# Patient Record
Sex: MALE | Race: WHITE | Employment: OTHER | URBAN - METROPOLITAN AREA
[De-identification: names, ages, dates, MRNs, and addresses within clinical notes are randomized per-mention and may not be internally consistent; named-entity substitution may affect disease eponyms.]

---

## 2023-03-11 ENCOUNTER — APPOINTMENT (OUTPATIENT)
Dept: CT IMAGING | Age: 78
End: 2023-03-11
Payer: MEDICARE

## 2023-03-11 ENCOUNTER — APPOINTMENT (OUTPATIENT)
Dept: MRI IMAGING | Age: 78
DRG: 062 | End: 2023-03-11
Attending: INTERNAL MEDICINE
Payer: MEDICARE

## 2023-03-11 ENCOUNTER — APPOINTMENT (OUTPATIENT)
Dept: CT IMAGING | Age: 78
DRG: 062 | End: 2023-03-11
Attending: INTERNAL MEDICINE
Payer: MEDICARE

## 2023-03-11 ENCOUNTER — HOSPITAL ENCOUNTER (INPATIENT)
Age: 78
LOS: 2 days | Discharge: HOME | DRG: 062 | End: 2023-03-13
Attending: INTERNAL MEDICINE | Admitting: INTERNAL MEDICINE
Payer: MEDICARE

## 2023-03-11 ENCOUNTER — HOSPITAL ENCOUNTER (EMERGENCY)
Age: 78
Discharge: ANOTHER HOSPITAL | End: 2023-03-11
Attending: EMERGENCY MEDICINE
Payer: MEDICARE

## 2023-03-11 VITALS
SYSTOLIC BLOOD PRESSURE: 172 MMHG | TEMPERATURE: 97.4 F | HEIGHT: 67 IN | BODY MASS INDEX: 24.64 KG/M2 | DIASTOLIC BLOOD PRESSURE: 83 MMHG | HEART RATE: 59 BPM | RESPIRATION RATE: 22 BRPM | WEIGHT: 157 LBS | OXYGEN SATURATION: 96 %

## 2023-03-11 DIAGNOSIS — I63.9 CEREBROVASCULAR ACCIDENT (CVA), UNSPECIFIED MECHANISM (HCC): Primary | ICD-10-CM

## 2023-03-11 LAB
ALBUMIN SERPL-MCNC: 4.3 G/DL (ref 3.5–4.6)
ALP BLD-CCNC: 104 U/L (ref 35–104)
ALT SERPL-CCNC: 18 U/L (ref 0–41)
ANION GAP SERPL CALCULATED.3IONS-SCNC: 9 MEQ/L (ref 9–15)
APTT: 41.7 SEC (ref 24.4–36.8)
AST SERPL-CCNC: 27 U/L (ref 0–40)
BACTERIA: NEGATIVE /HPF
BASOPHILS ABSOLUTE: 0.1 K/UL (ref 0–0.1)
BASOPHILS RELATIVE PERCENT: 1 % (ref 0.2–1.2)
BILIRUB SERPL-MCNC: 0.7 MG/DL (ref 0.2–0.7)
BILIRUBIN URINE: NEGATIVE
BLOOD, URINE: NORMAL
BUN BLDV-MCNC: 15 MG/DL (ref 8–23)
CALCIUM SERPL-MCNC: 9.9 MG/DL (ref 8.5–9.9)
CHLORIDE BLD-SCNC: 103 MEQ/L (ref 95–107)
CHP ED QC CHECK: YES
CLARITY: CLEAR
CO2: 29 MEQ/L (ref 20–31)
COLOR: YELLOW
CREAT SERPL-MCNC: 0.89 MG/DL (ref 0.7–1.2)
EOSINOPHILS ABSOLUTE: 0.1 K/UL (ref 0–0.5)
EOSINOPHILS RELATIVE PERCENT: 1.7 % (ref 0.8–7)
EPITHELIAL CELLS, UA: ABNORMAL /HPF
GFR SERPL CREATININE-BSD FRML MDRD: >60 ML/MIN/{1.73_M2}
GLOBULIN: 2.4 G/DL (ref 2.3–3.5)
GLUCOSE BLD-MCNC: 104 MG/DL (ref 70–99)
GLUCOSE BLD-MCNC: 112 MG/DL
GLUCOSE BLD-MCNC: 112 MG/DL
GLUCOSE BLD-MCNC: 112 MG/DL (ref 70–99)
GLUCOSE URINE: NEGATIVE MG/DL
HCT VFR BLD CALC: 42.6 % (ref 42–52)
HCT VFR BLD CALC: 43.5 % (ref 42–52)
HEMOGLOBIN: 14.5 G/DL (ref 13.7–17.5)
HEMOGLOBIN: 14.6 G/DL (ref 14–18)
IMMATURE GRANULOCYTES #: 0 K/UL
IMMATURE GRANULOCYTES %: 0.1 %
INR BLD: 1
KETONES, URINE: NEGATIVE MG/DL
LEUKOCYTE ESTERASE, URINE: NEGATIVE
LYMPHOCYTES ABSOLUTE: 1.4 K/UL (ref 1.3–3.6)
LYMPHOCYTES RELATIVE PERCENT: 20.8 %
MAGNESIUM: 2.2 MG/DL (ref 1.7–2.4)
MCH RBC QN AUTO: 31.3 PG (ref 27–31.3)
MCH RBC QN AUTO: 31.7 PG (ref 25.7–32.2)
MCHC RBC AUTO-ENTMCNC: 33.5 % (ref 33–37)
MCHC RBC AUTO-ENTMCNC: 34 % (ref 32.3–36.5)
MCV RBC AUTO: 93 FL (ref 79–92.2)
MCV RBC AUTO: 93.2 FL (ref 79–92.2)
MONOCYTES ABSOLUTE: 0.6 K/UL (ref 0.3–0.8)
MONOCYTES RELATIVE PERCENT: 9.3 % (ref 5.3–12.2)
NEUTROPHILS ABSOLUTE: 4.6 K/UL (ref 1.8–5.4)
NEUTROPHILS RELATIVE PERCENT: 67.1 % (ref 34–67.9)
NITRITE, URINE: NEGATIVE
PDW BLD-RTO: 12.3 % (ref 11.6–14.4)
PDW BLD-RTO: 13.5 % (ref 11.5–14.5)
PERFORMED ON: ABNORMAL
PH UA: 7 (ref 5–9)
PLATELET # BLD: 235 K/UL (ref 163–337)
PLATELET # BLD: 242 K/UL (ref 130–400)
POTASSIUM SERPL-SCNC: 4.4 MEQ/L (ref 3.4–4.9)
PROTEIN UA: NEGATIVE MG/DL
PROTHROMBIN TIME: 13.4 SEC (ref 12.3–14.9)
RBC # BLD: 4.58 M/UL (ref 4.63–6.08)
RBC # BLD: 4.66 M/UL (ref 4.7–6.1)
RBC UA: ABNORMAL /HPF (ref 0–2)
SODIUM BLD-SCNC: 141 MEQ/L (ref 135–144)
SPECIFIC GRAVITY UA: 1.01 (ref 1–1.03)
TOTAL PROTEIN: 6.7 G/DL (ref 6.3–8)
TROPONIN: <0.01 NG/ML (ref 0–0.01)
UROBILINOGEN, URINE: 0.2 E.U./DL
WBC # BLD: 15.8 K/UL (ref 4.8–10.8)
WBC # BLD: 6.9 K/UL (ref 4.2–9)
WBC UA: ABNORMAL /HPF (ref 0–5)

## 2023-03-11 PROCEDURE — 2580000003 HC RX 258: Performed by: INTERNAL MEDICINE

## 2023-03-11 PROCEDURE — 36415 COLL VENOUS BLD VENIPUNCTURE: CPT

## 2023-03-11 PROCEDURE — 81001 URINALYSIS AUTO W/SCOPE: CPT

## 2023-03-11 PROCEDURE — 94150 VITAL CAPACITY TEST: CPT

## 2023-03-11 PROCEDURE — 80053 COMPREHEN METABOLIC PANEL: CPT

## 2023-03-11 PROCEDURE — 83735 ASSAY OF MAGNESIUM: CPT

## 2023-03-11 PROCEDURE — 85027 COMPLETE CBC AUTOMATED: CPT

## 2023-03-11 PROCEDURE — 6360000002 HC RX W HCPCS: Performed by: INTERNAL MEDICINE

## 2023-03-11 PROCEDURE — 93005 ELECTROCARDIOGRAM TRACING: CPT

## 2023-03-11 PROCEDURE — 74176 CT ABD & PELVIS W/O CONTRAST: CPT

## 2023-03-11 PROCEDURE — 6370000000 HC RX 637 (ALT 250 FOR IP): Performed by: INTERNAL MEDICINE

## 2023-03-11 PROCEDURE — 70551 MRI BRAIN STEM W/O DYE: CPT

## 2023-03-11 PROCEDURE — 2000000000 HC ICU R&B

## 2023-03-11 PROCEDURE — 85610 PROTHROMBIN TIME: CPT

## 2023-03-11 PROCEDURE — 6360000002 HC RX W HCPCS

## 2023-03-11 PROCEDURE — 85730 THROMBOPLASTIN TIME PARTIAL: CPT

## 2023-03-11 PROCEDURE — 70450 CT HEAD/BRAIN W/O DYE: CPT

## 2023-03-11 PROCEDURE — 84484 ASSAY OF TROPONIN QUANT: CPT

## 2023-03-11 PROCEDURE — 6360000002 HC RX W HCPCS: Performed by: EMERGENCY MEDICINE

## 2023-03-11 PROCEDURE — 96374 THER/PROPH/DIAG INJ IV PUSH: CPT

## 2023-03-11 PROCEDURE — 96361 HYDRATE IV INFUSION ADD-ON: CPT

## 2023-03-11 PROCEDURE — 99285 EMERGENCY DEPT VISIT HI MDM: CPT

## 2023-03-11 PROCEDURE — 85025 COMPLETE CBC W/AUTO DIFF WBC: CPT

## 2023-03-11 PROCEDURE — 2580000003 HC RX 258: Performed by: EMERGENCY MEDICINE

## 2023-03-11 RX ORDER — SODIUM CHLORIDE, SODIUM LACTATE, POTASSIUM CHLORIDE, CALCIUM CHLORIDE 600; 310; 30; 20 MG/100ML; MG/100ML; MG/100ML; MG/100ML
INJECTION, SOLUTION INTRAVENOUS CONTINUOUS
Status: DISCONTINUED | OUTPATIENT
Start: 2023-03-11 | End: 2023-03-13 | Stop reason: HOSPADM

## 2023-03-11 RX ORDER — ENOXAPARIN SODIUM 100 MG/ML
40 INJECTION SUBCUTANEOUS DAILY
Status: DISCONTINUED | OUTPATIENT
Start: 2023-03-12 | End: 2023-03-12

## 2023-03-11 RX ORDER — ONDANSETRON 4 MG/1
4 TABLET, ORALLY DISINTEGRATING ORAL EVERY 8 HOURS PRN
Status: DISCONTINUED | OUTPATIENT
Start: 2023-03-11 | End: 2023-03-13 | Stop reason: HOSPADM

## 2023-03-11 RX ORDER — ASPIRIN 81 MG/1
81 TABLET ORAL DAILY
Status: DISCONTINUED | OUTPATIENT
Start: 2023-03-12 | End: 2023-03-13 | Stop reason: HOSPADM

## 2023-03-11 RX ORDER — ONDANSETRON 2 MG/ML
4 INJECTION INTRAMUSCULAR; INTRAVENOUS EVERY 6 HOURS PRN
Status: DISCONTINUED | OUTPATIENT
Start: 2023-03-11 | End: 2023-03-13 | Stop reason: HOSPADM

## 2023-03-11 RX ORDER — HYDRALAZINE HYDROCHLORIDE 20 MG/ML
10 INJECTION INTRAMUSCULAR; INTRAVENOUS EVERY 30 MIN PRN
Status: DISCONTINUED | OUTPATIENT
Start: 2023-03-11 | End: 2023-03-13 | Stop reason: HOSPADM

## 2023-03-11 RX ORDER — POLYETHYLENE GLYCOL 3350 17 G/17G
17 POWDER, FOR SOLUTION ORAL DAILY PRN
Status: DISCONTINUED | OUTPATIENT
Start: 2023-03-11 | End: 2023-03-13 | Stop reason: HOSPADM

## 2023-03-11 RX ORDER — LABETALOL HYDROCHLORIDE 5 MG/ML
10 INJECTION, SOLUTION INTRAVENOUS EVERY 10 MIN PRN
Status: DISCONTINUED | OUTPATIENT
Start: 2023-03-11 | End: 2023-03-13 | Stop reason: HOSPADM

## 2023-03-11 RX ORDER — ATORVASTATIN CALCIUM 80 MG/1
80 TABLET, FILM COATED ORAL NIGHTLY
Status: DISCONTINUED | OUTPATIENT
Start: 2023-03-11 | End: 2023-03-11

## 2023-03-11 RX ORDER — ACETAMINOPHEN 325 MG/1
650 TABLET ORAL EVERY 4 HOURS PRN
Status: DISCONTINUED | OUTPATIENT
Start: 2023-03-11 | End: 2023-03-13 | Stop reason: HOSPADM

## 2023-03-11 RX ORDER — ASPIRIN 300 MG/1
300 SUPPOSITORY RECTAL DAILY
Status: DISCONTINUED | OUTPATIENT
Start: 2023-03-12 | End: 2023-03-13 | Stop reason: HOSPADM

## 2023-03-11 RX ORDER — ACETAMINOPHEN 650 MG/1
650 SUPPOSITORY RECTAL EVERY 4 HOURS PRN
Status: DISCONTINUED | OUTPATIENT
Start: 2023-03-11 | End: 2023-03-13 | Stop reason: HOSPADM

## 2023-03-11 RX ORDER — ASPIRIN 81 MG/1
81 TABLET ORAL DAILY
COMMUNITY

## 2023-03-11 RX ORDER — HYDRALAZINE HYDROCHLORIDE 20 MG/ML
10 INJECTION INTRAMUSCULAR; INTRAVENOUS ONCE
Status: DISCONTINUED | OUTPATIENT
Start: 2023-03-11 | End: 2023-03-11

## 2023-03-11 RX ORDER — HYDRALAZINE HYDROCHLORIDE 20 MG/ML
10 INJECTION INTRAMUSCULAR; INTRAVENOUS ONCE
Status: COMPLETED | OUTPATIENT
Start: 2023-03-11 | End: 2023-03-11

## 2023-03-11 RX ORDER — ASPIRIN 81 MG/1
81 TABLET ORAL DAILY
Status: ON HOLD | COMMUNITY
End: 2023-03-11

## 2023-03-11 RX ORDER — TAMSULOSIN HYDROCHLORIDE 0.4 MG/1
0.4 CAPSULE ORAL DAILY
Status: DISCONTINUED | OUTPATIENT
Start: 2023-03-12 | End: 2023-03-12

## 2023-03-11 RX ORDER — HYDRALAZINE HYDROCHLORIDE 20 MG/ML
INJECTION INTRAMUSCULAR; INTRAVENOUS
Status: COMPLETED
Start: 2023-03-11 | End: 2023-03-11

## 2023-03-11 RX ORDER — ROSUVASTATIN CALCIUM 40 MG/1
40 TABLET, COATED ORAL NIGHTLY
Status: DISCONTINUED | OUTPATIENT
Start: 2023-03-11 | End: 2023-03-13 | Stop reason: HOSPADM

## 2023-03-11 RX ORDER — LIDOCAINE 4 G/G
1 PATCH TOPICAL DAILY
Status: DISCONTINUED | OUTPATIENT
Start: 2023-03-11 | End: 2023-03-13 | Stop reason: HOSPADM

## 2023-03-11 RX ORDER — SODIUM CHLORIDE 0.9 % (FLUSH) 0.9 %
10 SYRINGE (ML) INJECTION ONCE
Status: COMPLETED | OUTPATIENT
Start: 2023-03-11 | End: 2023-03-11

## 2023-03-11 RX ORDER — SIMVASTATIN 40 MG
40 TABLET ORAL NIGHTLY
COMMUNITY

## 2023-03-11 RX ORDER — MORPHINE SULFATE 2 MG/ML
1 INJECTION, SOLUTION INTRAMUSCULAR; INTRAVENOUS EVERY 4 HOURS PRN
Status: DISCONTINUED | OUTPATIENT
Start: 2023-03-11 | End: 2023-03-13 | Stop reason: HOSPADM

## 2023-03-11 RX ORDER — 0.9 % SODIUM CHLORIDE 0.9 %
1000 INTRAVENOUS SOLUTION INTRAVENOUS ONCE
Status: COMPLETED | OUTPATIENT
Start: 2023-03-11 | End: 2023-03-11

## 2023-03-11 RX ADMIN — HYDRALAZINE HYDROCHLORIDE 10 MG: 20 INJECTION INTRAMUSCULAR; INTRAVENOUS at 15:31

## 2023-03-11 RX ADMIN — Medication 10 ML: at 13:20

## 2023-03-11 RX ADMIN — SODIUM CHLORIDE, POTASSIUM CHLORIDE, SODIUM LACTATE AND CALCIUM CHLORIDE: 600; 310; 30; 20 INJECTION, SOLUTION INTRAVENOUS at 21:39

## 2023-03-11 RX ADMIN — SODIUM CHLORIDE 1000 ML: 9 INJECTION, SOLUTION INTRAVENOUS at 12:49

## 2023-03-11 RX ADMIN — ONDANSETRON 4 MG: 2 INJECTION INTRAMUSCULAR; INTRAVENOUS at 16:17

## 2023-03-11 RX ADMIN — ACETAMINOPHEN 650 MG: 325 TABLET ORAL at 21:25

## 2023-03-11 RX ADMIN — Medication 18 MG: at 13:18

## 2023-03-11 RX ADMIN — ROSUVASTATIN CALCIUM 40 MG: 40 TABLET, FILM COATED ORAL at 23:11

## 2023-03-11 ASSESSMENT — ENCOUNTER SYMPTOMS
ABDOMINAL PAIN: 0
ABDOMINAL DISTENTION: 0
APNEA: 0
RHINORRHEA: 0
DIARRHEA: 0
SINUS PRESSURE: 0
CONSTIPATION: 0
VOMITING: 0
COUGH: 0
SORE THROAT: 0
BACK PAIN: 0
WHEEZING: 0
NAUSEA: 0
EYE PAIN: 0
PHOTOPHOBIA: 0
SHORTNESS OF BREATH: 0
COLOR CHANGE: 0

## 2023-03-11 ASSESSMENT — PAIN - FUNCTIONAL ASSESSMENT
PAIN_FUNCTIONAL_ASSESSMENT: NONE - DENIES PAIN
PAIN_FUNCTIONAL_ASSESSMENT: NONE - DENIES PAIN

## 2023-03-11 ASSESSMENT — PAIN DESCRIPTION - LOCATION
LOCATION: ABDOMEN
LOCATION: ABDOMEN

## 2023-03-11 ASSESSMENT — PAIN SCALES - GENERAL
PAINLEVEL_OUTOF10: 7
PAINLEVEL_OUTOF10: 5
PAINLEVEL_OUTOF10: 0
PAINLEVEL_OUTOF10: 7
PAINLEVEL_OUTOF10: 7

## 2023-03-11 ASSESSMENT — PAIN DESCRIPTION - ORIENTATION
ORIENTATION: LEFT
ORIENTATION: LEFT

## 2023-03-11 ASSESSMENT — PAIN DESCRIPTION - RADICULAR PAIN: RADICULAR_PAIN: ABSENT

## 2023-03-11 ASSESSMENT — PAIN DESCRIPTION - DESCRIPTORS
DESCRIPTORS: SHARP
DESCRIPTORS: SHARP

## 2023-03-11 NOTE — PROGRESS NOTES
Spiritual Care Services     Summary of Visit:  Pt in bed, reports no residual effects, only complaint is back pain. Nurse and physician both present. Pt's wife Prosper Paniagua also present. Pt spiritually comfortable. No needs expressed at this time. Encounter Summary  Encounter Overview/Reason : Initial Encounter  Service Provided For[de-identified] Patient and family together  Referral/Consult From[de-identified] Rounding  Support System: Spouse  Complexity of Encounter: Low  Begin Time: 1600  End Time : 1384  Total Time Calculated: 15 min  Encounter   Type: Initial Screen/Assessment                            Spiritual Assessment/Intervention/Outcomes:    Assessment: Concerns with suffering    Intervention: Active listening, Discussed illness injury and its impact    Outcome: Coping      Care Plan:    Plan and Referrals  Plan/Referrals: Continue Support (comment)    Spiritual Care Services   Electronically signed by Brett Andujar [facility] on 3/11/2023 at 4:07 PM.    To reach a  for emotional and spiritual support, place an [facility] consult request.   If a  is needed immediately, dial 0 and ask to page the on-call .

## 2023-03-11 NOTE — ED NOTES
[facility] called and provided room assignment to ED [facility] - ICU 5     Isaac Alegria RN  03/11/23 6085

## 2023-03-11 NOTE — ED NOTES
Called Dr. Swan Player answering service to page for Dr santi matthew.       Giselle May  03/11/23 4432

## 2023-03-11 NOTE — PROGRESS NOTES
Pharmacy Consult    Reed Johnson is a 78 y.o. male for whom pharmacy [facility]  [facility] [employer] to change IV base solutions to 0.9% sodium chloride. Patient Active Problem List   Diagnosis    S/P CABG (coronary artery bypass graft)    Hypoxemia    CAD (coronary artery disease)    Hypertension    Acute CVA (cerebrovascular accident) ([facility] [address]       Allergies:  Patient has no known allergies. Recent Labs     03/11/23  1252   CREATININE 0.89   Estimated Creatinine Clearance: 67 mL/min (based on SCr of 0.89 mg/dL). .     ,  , There is no height or weight on file to calculate BMI. Assessment/Plan:    No orders requiring change at this time. Thank you for the consult. Will continue to follow.      Lois Funez [facility]  03/11/23  3:52 PM

## 2023-03-11 NOTE — ED NOTES
Called [phone] [facility] supervisor to inform we are sending code BAT.       Maye Left  03/11/23 6209

## 2023-03-11 NOTE — ED PROVIDER NOTES
[facility] ED  eMERGENCY dEPARTMENT eNCOUnter      Pt Name: Kam Vance  MRN: [id number]  [place] 1945  Date of evaluation: 3/11/2023  Provider: Graciela Le MD    CHIEF COMPLAINT       Chief Complaint   Patient presents with    Numbness     Right side head to toe         HISTORY OF PRESENT ILLNESS   (Location/Symptom, Timing/Onset,Context/Setting, Quality, Duration, Modifying Factors, Severity)  Note limiting factors. Kam Vance is a 78 y.o. male who presents to the emergency department with complaint of right side of face and lips numbness, right tongue and right lower extremity numbness which began while he was eating lunch. He also noted some slurred speech. Started about 1 and 1/2 hours ago. Mostly resolved but still residual.  Denies prior history of CVA or TIA. Not on blood thinners. History of coronary artery disease status post CABG. Comorbid conditions includes hyperlipidemia and hypertension. HPI    Nursing Notes were reviewed. REVIEW OF SYSTEMS    (2-9 systems for level 4, 10 or more for level 5)     Review of Systems   Constitutional: Negative. Negative for activity change, appetite change, chills, fatigue and fever. HENT:  Negative for congestion, ear discharge, ear pain, hearing loss, rhinorrhea, sinus pressure and sore throat. Eyes:  Negative for photophobia, pain and visual disturbance. Respiratory:  Negative for apnea, cough, shortness of breath and wheezing. Cardiovascular:  Negative for chest pain, palpitations and leg swelling. Gastrointestinal:  Negative for abdominal distention, abdominal pain, constipation, diarrhea, nausea and vomiting. Endocrine: Negative for cold intolerance, heat intolerance and polyuria. Genitourinary:  Negative for dysuria, flank pain, frequency and urgency. Musculoskeletal:  Negative for arthralgias, back pain, gait problem, myalgias and neck stiffness. Skin:  Negative for color change, pallor and rash. Allergic/Immunologic: Negative for food allergies and immunocompromised state. Neurological:  Positive for speech difficulty and numbness. Negative for dizziness, tremors, syncope, weakness, light-headedness and headaches. Hematological:  Negative for adenopathy. Psychiatric/Behavioral:  Negative for agitation, confusion and hallucinations. All other systems reviewed and are negative. Except as noted above the remainder of the review of systems was reviewed and negative. PAST MEDICAL HISTORY     Past Medical History:   Diagnosis Date    Hyperlipidemia          SURGICAL HISTORY       Past Surgical History:   Procedure Laterality Date    CARDIAC SURGERY           CURRENT MEDICATIONS       Previous Medications    ASPIRIN 81 MG EC TABLET    Take 81 mg by mouth daily    SIMVASTATIN (ZOCOR) 40 MG TABLET    Take 40 mg by mouth nightly       ALLERGIES     Patient has no known allergies. FAMILY HISTORY     History reviewed. No pertinent family history. SOCIAL HISTORY       Social History     Socioeconomic History    Marital status:      Spouse name: None    Number of children: None    Years of education: None    Highest education level: None   Tobacco Use    Smoking status: Never     Passive exposure: Never    Smokeless tobacco: Never   Vaping Use    Vaping Use: Never used   Substance and Sexual Activity    Alcohol use: Never    Drug use: Never    Sexual activity: Yes     Partners: Female       SCREENINGS   NIH Stroke Scale  Interval: Baseline  Level of Consciousness (1a): Not alert, requires repeated stimulation to attend  LOC Questions (1b): Answers both correctly  LOC Commands (1c): Performs both tasks correctly  Best Gaze (2): Normal  Visual (3): No visual loss  Facial Palsy (4): Normal symmetrical movement  Motor Arm, Left (5a): No drift  Motor Arm, Right (5b): No drift  Motor Leg, Left (6a): No drift  Motor Leg, Right (6b):  No drift  Limb Ataxia (7): Absent  Sensory (8): (!) Mild to Moderate (Reports right side numbness)  Best Language (9): Mild to moderate aphasia (Reports he feels like speech is slurred )  Dysarthria (10): Normal  Extinction and Inattention (11): No abnormality  Total: 4Glasgow Coma Scale  Eye Opening: Spontaneous  Best Verbal Response: Oriented  Best Motor Response: Obeys commands  Dariana Coma Scale Score: 15        PHYSICAL EXAM    (up to 7 for level 4, 8 or more for level 5)     ED Triage Vitals [03/11/23 1235]   BP Temp Temp Source Heart Rate Resp SpO2 Height Weight   (!) 172/77 97.6 °F (36.4 °C) Oral 63 28 96 % 5' 7\" (1.702 m) 157 lb (71.2 kg)       Physical Exam  Vitals and nursing note reviewed.   Constitutional:       General: He is not in acute distress.     Appearance: Normal appearance. He is well-developed and normal weight. He is not ill-appearing, toxic-appearing or diaphoretic.   HENT:      Head: Normocephalic and atraumatic.      Nose: Nose normal. No congestion or rhinorrhea.      Mouth/Throat:      Mouth: Mucous membranes are moist.      Pharynx: Oropharynx is clear. No oropharyngeal exudate or posterior oropharyngeal erythema.   Eyes:      General: No scleral icterus.        Right eye: No discharge.         Left eye: No discharge.      Extraocular Movements: Extraocular movements intact.      Conjunctiva/sclera: Conjunctivae normal.      Pupils: Pupils are equal, round, and reactive to light.   Neck:      Thyroid: No thyromegaly.      Vascular: No carotid bruit or JVD.      Trachea: No tracheal deviation.   Cardiovascular:      Rate and Rhythm: Normal rate and regular rhythm.      Pulses: Normal pulses.      Heart sounds: Normal heart sounds. No murmur heard.    No friction rub. No gallop.   Pulmonary:      Effort: Pulmonary effort is normal. No respiratory distress.      Breath sounds: Normal breath sounds. No stridor. No wheezing, rhonchi or rales.   Chest:      Chest wall: No tenderness.   Abdominal:      General: Abdomen is flat. Bowel  sounds are normal. There is no distension. Palpations: Abdomen is soft. There is no mass. Tenderness: There is no abdominal tenderness. There is no right CVA tenderness, left CVA tenderness, guarding or rebound. Hernia: No hernia is present. Musculoskeletal:         General: No swelling, tenderness, deformity or signs of injury. Normal range of motion. Cervical back: Normal range of motion and neck supple. No rigidity or tenderness. Right lower leg: No edema. Left lower leg: No edema. Lymphadenopathy:      Cervical: No cervical adenopathy. Skin:     General: Skin is warm and dry. Capillary Refill: Capillary refill takes less than 2 seconds. Coloration: Skin is not jaundiced or pale. Findings: No bruising, erythema, lesion or rash. Neurological:      General: No focal deficit present. Mental Status: He is alert and oriented to person, place, and time. Mental status is at baseline. Cranial Nerves: No cranial nerve deficit. Sensory: No sensory deficit. Motor: No weakness or abnormal muscle tone. Coordination: Coordination normal.      Gait: Gait normal.      Deep Tendon Reflexes: Reflexes are normal and symmetric. Reflexes normal.   Psychiatric:         Mood and Affect: Mood normal.         Behavior: Behavior normal.         Thought Content: Thought content normal.         Judgment: Judgment normal.       DIAGNOSTIC RESULTS     EKG: All EKG's are interpreted by the Emergency Department Physician who either signs or Co-signs this chart in the absence of a cardiologist.    Twelve-lead EKG shows sinus bradycardia with sinus arrhythmia, rate 58 bpm, normal intervals, normal electrical axis, no acute ST-T wave changes. My interpretation.     RADIOLOGY:   Non-plain film images such as CT, Ultrasound and MRI are read by the radiologist. Solange Cohn radiographicimages are visualized and preliminarily interpreted by the emergency physician with the below findings:    Chest x-ray showed no acute cardiopulmonary process. My interpretation. Interpretation per the Radiologist below, if available at the time of this note:    CT Head W/O Contrast   Final Result   1. No acute intracranial hemorrhage. 2. Focal area of decreased attenuation associated with left frontal white   matter may represent a area of chronic microvascular ischemia versus stroke   of uncertain chronicity. MRI could be helpful for further evaluation   3. Additional areas of decreased attenuation in periventricular white matter   of both hemispheres suggesting chronic microvascular ischemia. ED BEDSIDE ULTRASOUND:   Performed by ED Physician - none    LABS:  Labs Reviewed   CBC WITH AUTO DIFFERENTIAL - Abnormal; Notable for the following components:       Result Value    RBC 4.58 (*)     MCV 93.0 (*)     All other components within normal limits   APTT - Abnormal; Notable for the following components:    aPTT 41.7 (*)     All other components within normal limits   POCT GLUCOSE - Abnormal; Notable for the following components:    POC Glucose 112 (*)     All other components within normal limits   POCT GLUCOSE - Normal   PROTIME-INR   COMPREHENSIVE METABOLIC PANEL   MAGNESIUM   TROPONIN   URINALYSIS       All other labs were within normal range or not returned as of this dictation. EMERGENCY DEPARTMENT COURSE and DIFFERENTIALDIAGNOSIS/MDM:    Patient presented with symptoms that are consistent with acute CVA. NIH score was 1. Dariana Coma Scale was 15. Care plan was discussed with our neurologist on-call Dr. Hilton Larios who agreed that patient is thrombolysis candidate. Thrombolysis was discussed with patient and spouse and they are only agreeable. CT scan of the brain without IV contrast was read by radiologist as nonacute for hemorrhagic stroke. Patient's risk factors were reviewed. Indications and contraindications.   Reviewed that he is an excellent candidate for thrombolysis. Thrombolysis was administered without complications. Patient was transferred to [address] [place] [address] ICU service of hospitalist / neurologist for continuing care. He remained hemodynamically stable through ED course. Vitals:    Vitals:    03/11/23 1235 03/11/23 1300   BP: (!) 172/77    Pulse: 63    Resp: 28    Temp: 97.6 °F (36.4 °C)    TempSrc: Oral    SpO2: 96%    Weight: 157 lb (71.2 kg) 157 lb (71.2 kg)   Height: 5' 7\" (1.702 m) 5' 7\" (1.702 m)           MDM     Amount and/or Complexity of Data Reviewed  Clinical lab tests: reviewed and ordered  Tests in the radiology section of CPT®: reviewed and ordered  Tests in the medicine section of CPT®: ordered and reviewed  Review and summarize past medical records: yes  Discuss the patient with other providers: yes (Dr. Gibson Shetty neurologist.  Code BAT)  Independent visualization of images, tracings, or specimens: yes    Risk of Complications, Morbidity, and/or Mortality  Presenting problems: high  Diagnostic procedures: high  Management options: high    Critical Care  Total time providing critical care:  minutes    Patient Progress  Patient progress: stable      CRITICAL CARE TIME   Total Critical Care time was  minutes, excluding separately reportable procedures. There was a high probability of clinically significant/life threatening deterioration in the patient's condition which required my urgentintervention. CONSULTS:  None    PROCEDURES:  Unless otherwise noted below, none     Procedures    FINAL IMPRESSION      1. Cerebrovascular accident (CVA), unspecified mechanism ([address]          DISPOSITION/PLAN   DISPOSITION Decision To Transfer 03/11/2023 12:53:46 PM      PATIENT REFERRED TO:  No follow-up provider specified.     DISCHARGE MEDICATIONS:  New Prescriptions    No medications on file          (Please note that portions of this note were completed with a voice recognitionprogram.  Efforts were made to edit the dictations but occasionally words are mis-transcribed.)    Jase Castro MD (electronically signed)  Attending Emergency Physician          Jase Castro MD  03/11/23 5696

## 2023-03-11 NOTE — H&P
Department of Internal Medicine  General Internal Medicine  Attending History and Physical      CHIEF COMPLAINT:  CVA    Reason for Admission:  CVA sp TNKase    History Obtained From:  patient    HISTORY OF PRESENT ILLNESS:      The patient is a 78 y.o. male with significant past medical history of CAD sp CABG who presents from [facility] ED with R sided weakness sp TNK administration at [facility]. Pt states he awoke this AM with slight R sided lip tingling which progressed to the entire side of his R face, R arm and R leg and progressed to R sided weakness and slurred speech so he went to [facility] ED immediately. Pt was a code BAT and taken to CT which was negative for hemorrhage. Neuro was contacted and recommended administration of TNK and transfer to [facility] [place] for further management. Upon arrival to [facility], CVA symptoms had completely resolved. Only complaint was low back pain which is chronic and states it always hurts more laying down. BP elevated and prn hydralazine and labetalol ordered. Neuro and intensivist consulted. Past Medical History:        Diagnosis Date    Hyperlipidemia      Past Surgical History:        Procedure Laterality Date    CARDIAC SURGERY         Medications Prior to Admission:    Medications Prior to Admission: simvastatin (ZOCOR) 40 MG tablet, Take 40 mg by mouth nightly  aspirin 81 MG EC tablet, Take 81 mg by mouth daily    Allergies:  Patient has no known allergies. Social History:   Denies tobacco, etoh, drusg    Family History:   No family history on file. REVIEW OF SYSTEMS:  12 point ROS was negative unless otherwise noted in the HPI   PHYSICAL EXAM:    Vitals:  BP (!) 220/90   Pulse 60   Temp 98.2 °F (36.8 °C) (Oral)   Resp 22   SpO2 100%     Constitutional: Awake and alert in no acute distress.  Lying in bed comfortably  Head: Normocephalic, atraumatic  Eyes: EOMI, PERRLA  ENT: moist mucous membranes  Neck: neck supple, trachea midline  Lungs: non labored  Heart: RRR, normal S1 and S2  GI: non-distended  MSK: Full ROM bilaterally, 5/5 strength bilaterally, no edema noted  Skin: warm, dry  Neuro: Intact motor and sensory, no focal deficits  Psych: appropriate affect     DATA:  CBC:   Lab Results   Component Value Date/Time    WBC 6.9 03/11/2023 12:52 PM    RBC 4.58 03/11/2023 12:52 PM    HGB 14.5 03/11/2023 12:52 PM    HCT 42.6 03/11/2023 12:52 PM    MCV 93.0 03/11/2023 12:52 PM    MCH 31.7 03/11/2023 12:52 PM    MCHC 34.0 03/11/2023 12:52 PM    RDW 12.3 03/11/2023 12:52 PM     03/11/2023 12:52 PM     CMP:    Lab Results   Component Value Date/Time     03/11/2023 12:52 PM    K 4.4 03/11/2023 12:52 PM     03/11/2023 12:52 PM    CO2 29 03/11/2023 12:52 PM    BUN 15 03/11/2023 12:52 PM    CREATININE 0.89 03/11/2023 12:52 PM    LABGLOM >60.0 03/11/2023 12:52 PM    GLUCOSE 104 03/11/2023 12:52 PM    PROT 6.7 03/11/2023 12:52 PM    LABALBU 4.3 03/11/2023 12:52 PM    CALCIUM 9.9 03/11/2023 12:52 PM    BILITOT 0.7 03/11/2023 12:52 PM    ALKPHOS 104 03/11/2023 12:52 PM    AST 27 03/11/2023 12:52 PM    ALT 18 03/11/2023 12:52 PM     ASSESSMENT AND PLAN:      # CVA olinda TNK  - presented to [facility] ED with progressing R sided weakness  - CT head negative for hemorrhage  - neuro contacted and recommended TNK which was given  - pt transferred to [facility] ICU for closer monitoring  - neuro and intensivist consulted  - MRI, repeat CT head tomorrow  - holding aspirin/AC for 24 hours  - PT/OT, SLP - advance diet as tolerated  - BP control with hydralazine, labetalol  - tele    # CAD, HLD  - cont statin, resume aspirin in 24 hours when ok from neuro    Disposition: CVA sp TNKase administration. Monitoring closely in the ICU. Neuro and intensivist consulted. PT/OT and SLP. Anticipated length of stay greater than 48 hours.     Collins Domínguez DO  Internal Medicine   Hospitalist    >55 minutes in total care time Additional Area 2 Units: 4

## 2023-03-11 NOTE — ED TRIAGE NOTES
Patient presents to ED with c/o numbness and tingling on right side of face/mouth and progressively on right side that started this morning about 2 hrs ago while eating breakfast - Denies any pain

## 2023-03-11 NOTE — PROGRESS NOTES
Received pt from [facility] ER for post TNK. Pt up walking around room when he arrived, educated on bedrest and given urinal. Pt verbalized understanding. Pt began complaining of nausea and given PRN dose of Zofran with no relief. Escorted pt to MRI, on arrival pt began complaining of right arm numbness, right face numbness and tongue numbness. Dr Farzad Aguillon notified and no new orders received. On arrival to unit pt noted to be bradycardic in the 40's and complaining of LLQ abdominal pain radiating to his back. Pt having guarding and rebounding to the site. CT ABD ordered and escorted pt to CT scan. Pt continues to be bradycardic with abdominal pain and nausea. Dr Annetta Mckinley made aware at [phone] via perfectserve with no response. Dr Mejia Lob also aware, no new orders. Will continue to monitor.

## 2023-03-11 NOTE — ED NOTES
Transfer center called with Dr. Babak Khan on the line to speak with Dr. Melyssa Rodriguez.       Kita Chapa  03/11/23 1409

## 2023-03-12 ENCOUNTER — APPOINTMENT (OUTPATIENT)
Dept: CT IMAGING | Age: 78
DRG: 062 | End: 2023-03-12
Attending: INTERNAL MEDICINE
Payer: MEDICARE

## 2023-03-12 PROBLEM — G45.9 TIA (TRANSIENT ISCHEMIC ATTACK): Status: ACTIVE | Noted: 2023-03-12

## 2023-03-12 PROBLEM — Z92.82 RECEIVED INTRAVENOUS TISSUE PLASMINOGEN ACTIVATOR (TPA) IN EMERGENCY DEPARTMENT: Status: ACTIVE | Noted: 2023-03-12

## 2023-03-12 LAB
ANION GAP SERPL CALCULATED.3IONS-SCNC: 10 MEQ/L (ref 9–15)
BUN BLDV-MCNC: 18 MG/DL (ref 8–23)
CALCIUM SERPL-MCNC: 9.1 MG/DL (ref 8.5–9.9)
CHLORIDE BLD-SCNC: 109 MEQ/L (ref 95–107)
CHOLESTEROL, TOTAL: 110 MG/DL (ref 0–199)
CO2: 24 MEQ/L (ref 20–31)
CREAT SERPL-MCNC: 0.98 MG/DL (ref 0.7–1.2)
EKG ATRIAL RATE: 58 BPM
EKG P AXIS: 40 DEGREES
EKG P-R INTERVAL: 184 MS
EKG Q-T INTERVAL: 436 MS
EKG QRS DURATION: 98 MS
EKG QTC CALCULATION (BAZETT): 428 MS
EKG R AXIS: 2 DEGREES
EKG T AXIS: 71 DEGREES
EKG VENTRICULAR RATE: 58 BPM
GFR SERPL CREATININE-BSD FRML MDRD: >60 ML/MIN/{1.73_M2}
GLUCOSE BLD-MCNC: 94 MG/DL (ref 70–99)
HBA1C MFR BLD: 5.2 % (ref 4.8–5.9)
HCT VFR BLD CALC: 39.6 % (ref 42–52)
HDLC SERPL-MCNC: 41 MG/DL (ref 40–59)
HEMOGLOBIN: 13.7 G/DL (ref 14–18)
LDL CHOLESTEROL CALCULATED: 56 MG/DL (ref 0–129)
MCH RBC QN AUTO: 32.4 PG (ref 27–31.3)
MCHC RBC AUTO-ENTMCNC: 34.7 % (ref 33–37)
MCV RBC AUTO: 93.5 FL (ref 79–92.2)
PDW BLD-RTO: 13.6 % (ref 11.5–14.5)
PLATELET # BLD: 213 K/UL (ref 130–400)
POTASSIUM REFLEX MAGNESIUM: 4.3 MEQ/L (ref 3.4–4.9)
RBC # BLD: 4.24 M/UL (ref 4.7–6.1)
SODIUM BLD-SCNC: 143 MEQ/L (ref 135–144)
TRIGL SERPL-MCNC: 66 MG/DL (ref 0–150)
WBC # BLD: 10.8 K/UL (ref 4.8–10.8)

## 2023-03-12 PROCEDURE — 3E03317 INTRODUCTION OF OTHER THROMBOLYTIC INTO PERIPHERAL VEIN, PERCUTANEOUS APPROACH: ICD-10-PCS | Performed by: INTERNAL MEDICINE

## 2023-03-12 PROCEDURE — 93010 ELECTROCARDIOGRAM REPORT: CPT | Performed by: INTERNAL MEDICINE

## 2023-03-12 PROCEDURE — 80048 BASIC METABOLIC PNL TOTAL CA: CPT

## 2023-03-12 PROCEDURE — 92610 EVALUATE SWALLOWING FUNCTION: CPT

## 2023-03-12 PROCEDURE — 99291 CRITICAL CARE FIRST HOUR: CPT | Performed by: INTERNAL MEDICINE

## 2023-03-12 PROCEDURE — 97165 OT EVAL LOW COMPLEX 30 MIN: CPT

## 2023-03-12 PROCEDURE — 6370000000 HC RX 637 (ALT 250 FOR IP): Performed by: INTERNAL MEDICINE

## 2023-03-12 PROCEDURE — 2000000000 HC ICU R&B

## 2023-03-12 PROCEDURE — 97161 PT EVAL LOW COMPLEX 20 MIN: CPT

## 2023-03-12 PROCEDURE — 6360000004 HC RX CONTRAST MEDICATION: Performed by: PSYCHIATRY & NEUROLOGY

## 2023-03-12 PROCEDURE — 70496 CT ANGIOGRAPHY HEAD: CPT

## 2023-03-12 PROCEDURE — 99291 CRITICAL CARE FIRST HOUR: CPT | Performed by: PSYCHIATRY & NEUROLOGY

## 2023-03-12 PROCEDURE — 80061 LIPID PANEL: CPT

## 2023-03-12 PROCEDURE — 70498 CT ANGIOGRAPHY NECK: CPT

## 2023-03-12 PROCEDURE — 85027 COMPLETE CBC AUTOMATED: CPT

## 2023-03-12 PROCEDURE — 36415 COLL VENOUS BLD VENIPUNCTURE: CPT

## 2023-03-12 PROCEDURE — 6370000000 HC RX 637 (ALT 250 FOR IP): Performed by: PSYCHIATRY & NEUROLOGY

## 2023-03-12 PROCEDURE — 83036 HEMOGLOBIN GLYCOSYLATED A1C: CPT

## 2023-03-12 RX ORDER — TAMSULOSIN HYDROCHLORIDE 0.4 MG/1
0.8 CAPSULE ORAL DAILY
Status: DISCONTINUED | OUTPATIENT
Start: 2023-03-13 | End: 2023-03-13 | Stop reason: HOSPADM

## 2023-03-12 RX ORDER — CLOPIDOGREL BISULFATE 75 MG/1
75 TABLET ORAL DAILY
Status: DISCONTINUED | OUTPATIENT
Start: 2023-03-12 | End: 2023-03-13 | Stop reason: HOSPADM

## 2023-03-12 RX ADMIN — ASPIRIN 81 MG: 81 TABLET, COATED ORAL at 14:19

## 2023-03-12 RX ADMIN — TAMSULOSIN HYDROCHLORIDE 0.4 MG: 0.4 CAPSULE ORAL at 09:28

## 2023-03-12 RX ADMIN — IOPAMIDOL 75 ML: 612 INJECTION, SOLUTION INTRAVENOUS at 14:08

## 2023-03-12 RX ADMIN — ROSUVASTATIN CALCIUM 40 MG: 40 TABLET, FILM COATED ORAL at 21:07

## 2023-03-12 RX ADMIN — CLOPIDOGREL BISULFATE 75 MG: 75 TABLET, FILM COATED ORAL at 14:19

## 2023-03-12 ASSESSMENT — ENCOUNTER SYMPTOMS
SHORTNESS OF BREATH: 0
BACK PAIN: 0
NAUSEA: 0
CHOKING: 0
VOMITING: 0
PHOTOPHOBIA: 0
COLOR CHANGE: 0
TROUBLE SWALLOWING: 0

## 2023-03-12 ASSESSMENT — PAIN DESCRIPTION - RADICULAR PAIN
RADICULAR_PAIN: ABSENT

## 2023-03-12 ASSESSMENT — PAIN SCALES - GENERAL
PAINLEVEL_OUTOF10: 0

## 2023-03-12 NOTE — CONSULTS
Subjective:      Patient ID: Noé Zuleta is a 78 y.o. male who presents today for stroke. Mary Munoz HPI 78 right-handed gentleman scented to [facility] ER yesterday with right mouth and face numbness and then he had some numbness in his right arm as well. Symptoms started 2 hours after he woke up and was eating breakfast.  We were contacted for BAT protocol and after evaluation was recommended that he be treated with TNK even though his INR was low though this appeared to suggest either brainstem or thalamic stroke. Patient then improved and later after he arrived here he had more numbness and by the time he had the MRI his symptoms have resolved. For now his symptoms have resolved though the MRI was done quite early in the course of his illness. Patient reports no headache. Patient does have history of cardiovascular disease with a bypass operative procedures with a stent. He is only on aspirin. Patient does take simvastatin at home and in the past has not tolerated other medications. Patient does not have diabetes    Review of Systems   Constitutional:  Negative for fever. HENT:  Negative for ear pain, tinnitus and trouble swallowing. Eyes:  Negative for photophobia and visual disturbance. Respiratory:  Negative for choking and shortness of breath. Cardiovascular:  Negative for chest pain and palpitations. Gastrointestinal:  Negative for nausea and vomiting. Musculoskeletal:  Negative for back pain, gait problem, joint swelling, myalgias, neck pain and neck stiffness. Skin:  Negative for color change. Allergic/Immunologic: Negative for food allergies. Neurological:  Negative for dizziness, tremors, seizures, syncope, facial asymmetry, speech difficulty, weakness, light-headedness, numbness and headaches. Psychiatric/Behavioral:  Negative for behavioral problems, confusion, hallucinations and sleep disturbance.       Past Medical History:   Diagnosis Date    Hyperlipidemia      Past Surgical History:   Procedure Laterality Date    CARDIAC SURGERY       Social History     Socioeconomic History    Marital status:      Spouse name: Not on file    Number of children: Not on file    Years of education: Not on file    Highest education level: Not on file   Occupational History    Not on file   Tobacco Use    Smoking status: Never     Passive exposure: Never    Smokeless tobacco: Never   Vaping Use    Vaping Use: Never used   Substance and Sexual Activity    Alcohol use: Never    Drug use: Never    Sexual activity: Yes     Partners: Female   Other Topics Concern    Not on file   Social History Narrative    Not on file     Social Determinants of Health     Financial Resource Strain: Not on file   Food Insecurity: Not on file   Transportation Needs: Not on file   Physical Activity: Not on file   Stress: Not on file   Social Connections: Not on file   Intimate Partner Violence: Not on file   Housing Stability: Not on file     No family history on file.   No Known Allergies  Current Facility-Administered Medications   Medication Dose Route Frequency Provider Last Rate Last Admin    ondansetron (ZOFRAN-ODT) disintegrating tablet 4 mg  4 mg Oral Q8H PRN Noah Felicity, DO        Or    ondansetron [facility] injection 4 mg  4 mg IntraVENous Q6H PRN Noah Felicity, DO   4 mg at 03/11/23 1617    polyethylene glycol (GLYCOLAX) packet 17 g  17 g Oral Daily PRN Noah Felicity, DO        enoxaparin (LOVENOX) injection 40 mg  40 mg SubCUTAneous Daily Noah Felicity, DO        aspirin EC tablet 81 mg  81 mg Oral Daily Noah Felicity, DO        Or    aspirin suppository 300 mg  300 mg Rectal Daily Noah Felicity, DO        acetaminophen (TYLENOL) tablet 650 mg  650 mg Oral Q4H PRN Noah Felicity, DO   650 mg at 03/11/23 2125    Or    acetaminophen (TYLENOL) suppository 650 mg  650 mg Rectal Q4H PRN Noah Felicity, DO        labetalol (NORMODYNE;TRANDATE) injection 10 mg  10 mg IntraVENous Q10 Min PRN Noah Felicity, DO hydrALAZINE (APRESOLINE) injection 10 mg  10 mg IntraVENous Q30 Min PRN [facility] DO        lidocaine 4 % external patch 1 patch  1 patch TransDERmal Daily [facility] DO   1 patch at 03/11/23 1620    morphine (PF) injection 1 mg  1 mg IntraVENous Q4H PRN Paul Labor Sedar, DO        tamsulosin (FLOMAX) capsule 0.4 mg  0.4 mg Oral Daily Paul Labor Sedar, DO   0.4 mg at 03/12/23 0426    lactated ringers IV soln infusion   IntraVENous Continuous Paul Labor Sedar,  mL/hr at 03/12/23 0523 Rate Verify at 03/12/23 0523    rosuvastatin (CRESTOR) tablet 40 mg  40 mg Oral Nightly Nemesio D Sedar, DO   40 mg at 03/11/23 2311        Objective:   BP (!) 111/43   Pulse (!) 49   Temp 98.5 °F (36.9 °C) (Oral)   Resp 13   Ht 5' 7\" (1.702 m)   Wt 167 lb (75.8 kg)   SpO2 96%   BMI 26.16 kg/m²     Physical Exam  Vitals reviewed. Eyes:      Pupils: Pupils are equal, round, and reactive to light. Cardiovascular:      Rate and Rhythm: Normal rate and regular rhythm. Heart sounds: No murmur heard. Pulmonary:      Effort: Pulmonary effort is normal.      Breath sounds: Normal breath sounds. Abdominal:      General: Bowel sounds are normal.   Musculoskeletal:         General: Normal range of motion. Cervical back: Normal range of motion. Skin:     General: Skin is warm. Neurological:      Mental Status: He is alert and oriented to person, place, and time. Cranial Nerves: No cranial nerve deficit. Sensory: No sensory deficit. Motor: No abnormal muscle tone. Coordination: Coordination normal.      Deep Tendon Reflexes: Reflexes are normal and symmetric. Babinski sign absent on the right side. Babinski sign absent on the left side.    Psychiatric:         Mood and Affect: Mood normal.     Examination except gait is deferred  CT ABDOMEN PELVIS WO CONTRAST Additional Contrast? None    Result Date: 3/12/2023  EXAMINATION: CT OF THE ABDOMEN AND PELVIS WITHOUT CONTRAST 3/11/2023 6:11 pm TECHNIQUE: CT of the abdomen and pelvis was performed without the administration of intravenous contrast. Multiplanar reformatted images are provided for review. Automated exposure control, iterative reconstruction, and/or weight based adjustment of the mA/kV was utilized to reduce the radiation dose to as low as reasonably achievable. COMPARISON: None. HISTORY: ORDERING SYSTEM PROVIDED HISTORY: abdominal pain TECHNOLOGIST PROVIDED HISTORY: Reason for exam:->abdominal pain Additional Contrast?->None What reading provider will be dictating this exam?->CRC FINDINGS: Lower Chest: Lung bases are clear. Organs: Liver without focal lesion. Gallbladder unremarkable. Pancreas and spleen unremarkable. Adrenals without nodule. Kidneys demonstrate normal appearance right kidney. Left kidney has mild-to-moderate left hydronephrosis and left hydroureter extending to the distal left ureter where there is a 7.5 mm calcification density of a distal left ureteral stone located the S2 level. GI/Bowel: Small hiatal hernia. No focal thickening or disproportion dilatation of bowel. No inflammatory findings. Moderate to large distal colonic and rectosigmoid colonic stool burden. Pelvis: No suspicious pelvic lesion or bulky pelvic adenopathy/free fluid. Peritoneum/Retroperitoneum: No bulky retroperitoneal adenopathy. No suspicious peritoneal or mesenteric process Vasculature: Grossly normal caliber of abdominal aorta and vasculature Bones/Soft Tissues: No acute osseous or soft tissue findings. Obstructing left 7 mm distal ureteral stone at the S2 level with upstream mild-to-moderate left hydronephrosis and minimal perinephric stranding. CT Head W/O Contrast    Addendum Date: 3/11/2023    ADDENDUM: Results called by core team to Dr. Addy Infante at 1:10 p.m.      Result Date: 3/11/2023  EXAMINATION: CT OF THE HEAD WITHOUT CONTRAST  3/11/2023 12:48 pm TECHNIQUE: CT of the head was performed without the administration of intravenous contrast. Automated exposure control, iterative reconstruction, and/or weight based adjustment of the mA/kV was utilized to reduce the radiation dose to as low as reasonably achievable. COMPARISON: None. HISTORY: ORDERING SYSTEM PROVIDED HISTORY: Right-sided numbness TECHNOLOGIST PROVIDED HISTORY: Reason for exam:->Right-sided numbness Has a \"code stroke\" or \"stroke alert\" been called? ->Yes What reading provider will be dictating this exam?->CRC FINDINGS: No intracranial hemorrhage. No abnormal extra-axial fluid collections. There is a area of hypoattenuation located in left frontal white matter. Additional confluent areas of decreased attenuation are present in periventricular white matter of frontal lobes. No hydrocephalus. Basilar cisterns are patent. Visualized paranasal sinuses and mastoid air cells are clear. 1. No acute intracranial hemorrhage. 2. Focal area of decreased attenuation associated with left frontal white matter may represent a area of chronic microvascular ischemia versus stroke of uncertain chronicity. MRI could be helpful for further evaluation 3. Additional areas of decreased attenuation in periventricular white matter of both hemispheres suggesting chronic microvascular ischemia. MRI brain without contrast    Result Date: 3/11/2023  EXAMINATION: MRI OF THE BRAIN WITHOUT CONTRAST  3/11/2023 4:38 pm TECHNIQUE: Multiplanar multisequence MRI of the brain was performed without the administration of intravenous contrast. COMPARISON: None. HISTORY: ORDERING SYSTEM PROVIDED HISTORY: cva TECHNOLOGIST PROVIDED HISTORY: Reason for exam:->cva What reading provider will be dictating this exam?->CRC FINDINGS: INTRACRANIAL STRUCTURES/VENTRICLES: There is no acute infarct. No mass effect or midline shift. No evidence of an acute intracranial hemorrhage. Involutional parenchymal changes. No ventriculomegaly.   Scattered periventricular, deep, and subcortical white matter T2/FLAIR hyperintensities are nonspecific and likely related to microvascular ischemic disease. The sellar/suprasellar regions appear unremarkable. The normal signal voids within the major intracranial vessels appear maintained. ORBITS: The visualized portion of the orbits demonstrate no acute abnormality. SINUSES: The visualized paranasal sinuses and mastoid air cells demonstrate no acute abnormality. BONES/SOFT TISSUES: The bone marrow signal intensity appears normal. The soft tissues demonstrate no acute abnormality. 1. No acute intracranial abnormality. Specifically, no evidence of acute infarct. 2. Involutional parenchymal changes with mild microvascular ischemic disease.        Lab Results   Component Value Date/Time    WBC 10.8 03/12/2023 04:30 AM    RBC 4.24 03/12/2023 04:30 AM    HGB 13.7 03/12/2023 04:30 AM    HCT 39.6 03/12/2023 04:30 AM    MCV 93.5 03/12/2023 04:30 AM    MCH 32.4 03/12/2023 04:30 AM    MCHC 34.7 03/12/2023 04:30 AM    RDW 13.6 03/12/2023 04:30 AM     03/12/2023 04:30 AM     Lab Results   Component Value Date/Time     03/12/2023 04:30 AM    K 4.3 03/12/2023 04:30 AM     03/12/2023 04:30 AM    CO2 24 03/12/2023 04:30 AM    BUN 18 03/12/2023 04:30 AM    CREATININE 0.98 03/12/2023 04:30 AM    LABGLOM >60.0 03/12/2023 04:30 AM    GLUCOSE 94 03/12/2023 04:30 AM    PROT 6.7 03/11/2023 12:52 PM    LABALBU 4.3 03/11/2023 12:52 PM    CALCIUM 9.1 03/12/2023 04:30 AM    BILITOT 0.7 03/11/2023 12:52 PM    ALKPHOS 104 03/11/2023 12:52 PM    AST 27 03/11/2023 12:52 PM    ALT 18 03/11/2023 12:52 PM     Lab Results   Component Value Date/Time    PROTIME 13.4 03/11/2023 12:52 PM    INR 1.0 03/11/2023 12:52 PM     No results found for: TSH, [id number], FOLATE, FERRITIN, IRON, TIBC, PTRFSAT, RETICCOUNT, TSH, FREET4  Lab Results   Component Value Date/Time    TRIG 66 03/12/2023 04:30 AM    HDL 41 03/12/2023 04:30 AM    LDLCALC 56 03/12/2023 04:30 AM     No results found for: Diane Kerr, CANNAB, Ivin Mews, OPIATESCREENURINE, PHENCYCLIDINESCREENURINE, PPXUR, ETOH  No results found for: LITHIUM, DILFRTOT, VALPROATE    Assessment:   Left cerebral stroke to be a thalamic or brainstem stroke. Patient presented to [facility] emergency room ER and we were contacted regarding the same. BAT protocol done and TNK given. Patient was then transferred here and had some increasing symptoms and an MRI which was negative. The MRI was done too early in the course of the disease process and sometimes we may not see that early changes. In any case patient now appears to be asymptomatic and was on aspirin when he had the symptoms. We will combine antiplatelet agents for now Plavix. Patient is already on cholesterol-lowering agents. Patient did not have a CT angiogram which will be done today. This consultation includes my consultation with [facility] ER with a neuro critical care time of 45 minutes and reevaluation of his MRI later when it was done. Jagjit Casillas MD, Arjun Gerard, American Board of Psychiatry & Neurology  Board Certified in Vascular Neurology  Board Certified in Neuromuscular Medicine  Certified in Community Health:

## 2023-03-12 NOTE — PLAN OF CARE
See OT evaluation for all goals and OT POC.  Electronically signed by MCKINLEY Johnson/L on 3/12/2023 at 3:03 PM

## 2023-03-12 NOTE — CONSULTS
Inpatient consult to Critical Care  Consult performed by: Shon Orourke MD  Consult ordered by: Obey Mendez DO          Admit Date: 3/11/2023    PCP:  Ovi Amado / HPI:                The patient is a 78 y.o. male with significant past medical history of coronary artery disease, hypertension and hyperlipidemia  presents with complaints of right-sided weakness. He went initially to [facility].  Right-sided weakness was associated with slurred speech. Code stroke was called in [facility] ER. He had a CT head which was unremarkable and therefore he received TNK. He was transferred to [facility] ICU for further care. His neurological symptom has improved dramatically afterTNK. His blood pressure was elevated on presentation. He started then complaining of nausea and vomiting. He was sent for an MRI brain after arriving here which was unremarkable. He was having also abdominal pain on the left side. CT abdomen performed which showed left ureter stone. Past Medical History:      Diagnosis Date    Hyperlipidemia         Past Surgical History:        Procedure Laterality Date    CARDIAC SURGERY         Current Medications:     enoxaparin  40 mg SubCUTAneous Daily    aspirin  81 mg Oral Daily    Or    aspirin  300 mg Rectal Daily    lidocaine  1 patch TransDERmal Daily    tamsulosin  0.4 mg Oral Daily    rosuvastatin  40 mg Oral Nightly     Home Meds:  Prior to Admission medications    Medication Sig Start Date End Date Taking?  Authorizing Provider   aspirin EC 81 MG EC tablet Take 81 mg by mouth daily   Yes Historical Provider, MD   simvastatin (ZOCOR) 40 MG tablet Take 40 mg by mouth nightly    Historical Provider, MD   VITAMIN C, CALCIUM ASCORBATE, PO Take 1 tablet by mouth daily    Historical Provider, MD   Ginkgo Biloba 120 MG CAPS Take 1 tablet by mouth daily    Historical Provider, MD   Misc Natural Products (GLUCOSAMINE CHOND CMP TRIPLE) TABS Take by mouth Historical Provider, MD   Multiple Vitamins-Minerals (MULTIVITAMIN ADULT EXTRA C PO) Take 1 tablet by mouth    Historical Provider, MD       Allergies:  Patient has no known allergies. Social History:      reports that he has never smoked. He has never been exposed to tobacco smoke. He has never used smokeless tobacco. He reports that he does not drink alcohol and does not use drugs. TOBACCO:   reports that he has never smoked. He has never been exposed to tobacco smoke. He has never used smokeless tobacco.     ETOH:   reports no history of alcohol use. Family History:   family history is not on file. Review of Systems  Complete review of systems done and negative unless otherwise noted positive.        Objective:     PHYSICAL EXAM:      VITALS:  BP (!) 111/43   Pulse (!) 45   Temp 98.5 °F (36.9 °C) (Oral)   Resp 18   Ht 5' 7\" (1.702 m)   Wt 167 lb (75.8 kg)   SpO2 95%   BMI 26.16 kg/m²   24HR INTAKE/OUTPUT:    Intake/Output Summary (Last 24 hours) at 3/12/2023 0825  Last data filed at 3/12/2023 0523  Gross per 24 hour   Intake 1131.39 ml   Output 550 ml   Net 581.39 ml     CURRENT PULSE OXIMETRY:  SpO2: 95 %  24HR PULSE OXIMETRY RANGE:  SpO2  Av.3 %  Min: 91 %  Max: 100 %    General appearance - alert, well appearing, and in no distress  Mental status - alert, oriented to person, place, and time  Eyes - pupils equal and reactive, extraocular eye movements intact  Nose - normal and patent, no erythema, discharge or polyps  Neck - supple, no significant adenopathy  Chest - clear to auscultation, no wheezes, rales or rhonchi, symmetric air entry  Heart - normal rate, regular rhythm, normal S1, S2, no murmurs, rubs, clicks or gallops  Abdomen - soft, nontender, nondistended, no masses or organomegaly  Rectal - deferred, not clinically indicated  Neurological - alert, oriented, normal speech, no focal findings or movement disorder noted, motor and sensory grossly normal bilaterally  Musculoskeletal - no joint tenderness, deformity or swelling  Extremities - peripheral pulses normal, no pedal edema, no clubbing or cyanosis  Skin - normal coloration and turgor, no rashes, no suspicious skin lesions noted         DATA:    CBC:   Recent Labs     03/11/23  1252 03/11/23  1838 03/12/23  0430   WBC 6.9 15.8* 10.8   HGB 14.5 14.6 13.7*   HCT 42.6 43.5 39.6*    242 213     BMP:    Recent Labs     03/11/23  1251 03/11/23  1252 03/12/23  0430   NA  --  141 143   K  --  4.4 4.3   CL  --  103 109*   CO2  --  29 24   BUN  --  15 18   CREATININE  --  0.89 0.98   GLUCOSE 112 104* 94   CALCIUM  --  9.9 9.1   MG  --  2.2  --      HEPATIC:   Recent Labs     03/11/23  1252   AST 27   ALT 18   BILITOT 0.7   ALKPHOS 104     Recent Labs     03/11/23  1252   TROPONINI <0.010     Recent Labs     03/12/23  0430   CHOL 110   TRIG 66   HDL 41     INR:   Recent Labs     03/11/23  1252   INR 1.0     BLOOD GAS: No results for input(s): PH, PCO2, PO2, HCO3, O2SAT in the last 72 hours. UA:  Recent Labs     03/11/23  1345   COLORU Yellow   NITRU Negative   LEUKOCYTESUR Negative   GLUCOSEU Negative   KETUA Negative   RBCUA 5-10*   [address] 0-2   BACTERIA Negative         Radiology Review:    CXR portable: No results found for this or any previous visit. Echo:    Assessment/Plan         Impression:    -Acute CVA status post TNK. Improvement in neurological exam.  -Accelerated hypertension. Overall blood pressure is better now.  -Left ureter stone resulting in significant abdominal and flank pain.  -Coronary artery disease.  -Hyperlipidemia.  -Suspect sleep disordered breathing. Recommendations:    -Admitted to the ICU for neurochecks. -Repeat CT head at 24 hours after TNK.  -Neurochecks every 1 hour.  -Reviewed CT images of the abdomen. We will consult urology.  -Continue hydration.  -Continue statins.  -Blood pressure control.  -Strict intake and output measurement.   Watch kidney function closely.  -Tight glucose control. Full Code    Excluding procedures, the total critical care time caring for this patient with life threatening, unstable organ failure, including direct patient contact, review of medical record, management of life support systems, review of data including imaging and labs, discussions with other team members, patient's family and physicians at least 31 minutes so far today.      Electronically signed by Xochitl Lindquist MD on 3/12/2023 at 8:25 AM

## 2023-03-12 NOTE — PROGRESS NOTES
Physical Therapy Med Surg Initial Assessment  Facility/Department: [facility] ICU  Room: [place] [address]       NAME: Kirsty Rudd  : 1945 (43 y.o.)  MRN: [id number]  CODE STATUS: Full Code    Date of Service: 3/12/2023    Patient Diagnosis(es): Acute CVA (cerebrovascular accident) [facility]) [I63.9]   No chief complaint on file.     Patient Active Problem List    Diagnosis Date Noted    TIA (transient ischemic attack) 2023    Received intravenous tissue plasminogen activator (tPA) in emergency department 2023    Acute CVA (cerebrovascular accident) ([facility] [address] 2023    S/P CABG (coronary artery bypass graft) 2011    Hypoxemia 2011    CAD (coronary artery disease) 2011    Hypertension 2011        Past Medical History:   Diagnosis Date    Hyperlipidemia      Past Surgical History:   Procedure Laterality Date    CARDIAC SURGERY         Patient assessed for rehabilitation services?: Yes  Family / Caregiver Present: Yes (wife)     SUBJECTIVE:   Pain   0/10    Prior Level of Function:  Social/Functional History  Lives With: Spouse  Type of Home: House  Home Layout: Two level, Able to Live on Main level with bedroom/bathroom  Home Access: Stairs to enter with rails  Entrance Stairs - Number of Steps: 5  Entrance Stairs - Rails: Both  Bathroom Shower/Tub: Tub/Shower unit  Bathroom Toilet: Standard  ADL Assistance: Independent  Homemaking Assistance: Independent  Ambulation Assistance: Independent  Transfer Assistance: Independent  Active : Yes  Occupation: Retired  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[employer], 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  Additional Comments: \"I play softball and I run\"    OBJECTIVE:   Vision  Vision: Within Functional Limits  Hearing: Exceptions to [facility]  Hearing Exceptions: Hard of hearing/hearing concerns    Cognition:  Overall Orientation Status: Within Functional Limits  Follows Commands: Within Functional Limits    Observation/Palpation  Posture: Good  Observation: Pt alert and attentive, agreeable to therapy assessment    ROM:  RLE AROM: WFL  LLE AROM : WFL    Strength:  Strength RLE  Strength RLE: WFL  Strength LLE  Strength LLE: WFL    Neuro:  Balance  Sitting - Static: Good  Sitting - Dynamic: Good  Standing - Static: Good  Standing - Dynamic: Good (no LOB will picking up items from floor)    Sensation: Intact    Bed mobility  Bed Mobility Comments: NT pt up in bedside recliner- anticipate indep    Transfers  Sit to Stand: Independent  Stand to Sit: Independent  Comment: no AD; no dizziness    Ambulation  Surface: Level tile  Device: No Device  Assistance: Independent  Quality of Gait: no LOB with increased speed  Gait Deviations: None  Distance: 200ft    Stairs/Curb  Stairs?: No    Activity Tolerance  Activity Tolerance: Patient tolerated evaluation without incident    Patient Education  Education Given To: Patient  Education Provided: Role of Therapy;Plan of Care  Education Method: Verbal  Education Outcome: Verbalized understanding       ASSESSMENT:   Decision Making: Low Complexity  History: high  Exam: low  Clinical Presentation: low    Therapy Prognosis: Excellent    DISCHARGE RECOMMENDATIONS:  No Skilled PT: Independent with functional mobility     Assessment: Pt indep no continued PT indicated  Requires PT Follow-Up: No       PLAN OF CARE:  Physcial Therapy Plan  Additional Comments: Pt indep no continued PT indicated    Safety Devices  Type of Devices: Call light within reach, Left in chair, All fall risk precautions in place  Restraints  Restraints Initially in Place: No    AMPAC (6 CLICK) BASIC MOBILITY  AM-PAC Inpatient Mobility Raw Score : 24     Therapy Time:   Individual   Time In 1332   Time Out 1343   Minutes 11       Eval x 11 min    Tali Fuentes PT, 03/12/23 at 2:38 PM         Definitions for assistance levels  Independent = pt does not require any physical supervision or assistance from another person for activity completion. Device may be needed.   Stand by assistance = pt requires verbal cues or instructions from another person, close to but not touching, to perform the activity  Minimal assistance= pt performs 75% or more of the activity; assistance is required to complete the activity  Moderate assistance= pt performs 50% of the activity; assistance is required to complete the activity  Maximal assistance = pt performs 25% of the activity; assistance is required to complete the activity  Dependent = pt requires total physical assistance to accomplish the task

## 2023-03-12 NOTE — PROGRESS NOTES
1900 Shift report at bedside and skin assessment performed with [facility]. Pt complains of left abdominal pain, reports he has a hx of kidney stones and this feels like that. Swallow screen completed, patient passed. 2030 Perfect served Dr Summer Cabrera to let him know about 7.5 stone in left ureter. 2050 Called  to get a call from Dr Anushka Rodriguez to address MRI and neuro orders  2055 Dr Anushka Rodriguez called back, informed him of MRI, no infarction. CT stone in left ureter. Asked about current orders for NIHSS every 30 minutes, he said no there is no stoke do them Q4 hr.    2115 Spoke with Dr Summer Cabrera on the phone about kidney stone, received orders for LR, Flomax and PRN morphine. 2145 Spoke with dr Summer Cabrera again, pt declined Lipitor, said he tried it before and it gave him muscle pain. The patient currently takes simvastin 80 mg at home, per Dr Summer Cabrera spoke with the patient about trying Crestor for better outcome. Pt agrees. 1309-4158 daylight savings time change  0600 Pt's heart rate dropped to 39, pt asymptomatic and heart rate came right back up.

## 2023-03-12 NOTE — PROGRESS NOTES
[facility] OCCUPATIONAL THERAPY EVALUATION - ACUTE     NAME: Jaiden Harrington  : 1945 (72 y.o.)  MRN: [id number]  CODE STATUS: Full Code  Room: [place] [address]    Date of Service: 3/12/2023    Patient Diagnosis(es): Acute CVA (cerebrovascular accident) [facility]) [I63.9]   Patient Active Problem List    Diagnosis Date Noted    TIA (transient ischemic attack) 2023    Received intravenous tissue plasminogen activator (tPA) in emergency department 2023    Acute CVA (cerebrovascular accident) ([facility] [address] 2023    S/P CABG (coronary artery bypass graft) 2011    Hypoxemia 2011    CAD (coronary artery disease) 2011    Hypertension 2011        Past Medical History:   Diagnosis Date    Hyperlipidemia      Past Surgical History:   Procedure Laterality Date    CARDIAC SURGERY          Restrictions  Restrictions/Precautions: Up as Tolerated     Safety Devices: Safety Devices  Type of Devices: Call light within reach; Left in chair; All fall risk precautions in place     Patient's date of birth confirmed: Yes    General:  Chart Reviewed: Yes  Patient assessed for rehabilitation services?: Yes    Subjective  Subjective: \"I feel back to normal\"       Pain at start of treatment: No    Pain at end of treatment: No    Location:   Description:   Nursing notified: Not Applicable  RN:   Intervention: Repositioned    Prior Level of Function:  Social/Functional History  Lives With: Spouse  Type of Home: House  Home Layout: Two level, Able to Live on Main level with bedroom/bathroom  Home Access: Stairs to enter with rails  Entrance Stairs - Number of Steps: 5  Entrance Stairs - Rails: Both  Bathroom Shower/Tub: Tub/Shower unit  Bathroom Toilet: Standard  ADL Assistance: Independent  Homemaking Assistance: Independent  Ambulation Assistance: Independent  Transfer Assistance: Independent  Active : Yes  Occupation: Retired  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[employer], 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  Additional Comments: \"I play softball and I run\"    OBJECTIVE:     Orientation Status:  Orientation  Overall Orientation Status: Within Functional Limits    Observation:  Observation/Palpation  Posture: Good  Observation: Pt alert and attentive, agreeable to therapy assessment    Cognition Status:  Cognition  Overall Cognitive Status: WFL    Perception Status:  Perception  Overall Perceptual Status: WFL    Vision and Hearing Status:  Hearing  Hearing: Exceptions to [facility]  Hearing Exceptions: Hard of hearing/hearing concerns   Vision - Basic Assessment  Prior Vision: No visual deficits  Visual History: No significant visual history  Patient Visual Report: No visual complaint reported. Visual Field Cut: No  Oculo Motor Control: WNL    GROSS ASSESSMENT AROM/PROM:  AROM: Within functional limits       ROM:   LUE AROM (degrees)  LUE AROM : WFL  Left Hand AROM (degrees)  Left Hand AROM: WFL  RUE AROM (degrees)  RUE AROM : WFL  Right Hand AROM (degrees)  Right Hand AROM: WFL    UE STRENGTH:  Strength: Within functional limits    UE COORDINATION:  Coordination: Within functional limits    UE TONE:  Tone: Normal    UE SENSATION:  Sensation: Intact    Hand Dominance:  Hand Dominance  Hand Dominance: Right    ADL Status:  ADL  Feeding: Independent  Grooming: Independent  UE Bathing: Independent  LE Bathing: Independent  UE Dressing: Independent  LE Dressing: Independent  Toileting: Independent  Additional Comments: Simulated ADLs as above, no limitations noted, denies concerns or difficulty completing any tasks this AM  Toilet Transfers  Toilet Transfer: Unable to assess  Toilet Transfers Comments: Declines need, anticipate independent    Functional Mobility:    Transfers  Sit to stand: Independent  Stand to sit: Independent    Patient ambulated household distance in hallway with No device at Independent level. Quick pace, navigates around obstacles without cuing, retrieves objects from floor with G balance and safety.  No difficulty navigating turns or doorways. Bed Mobility  Bed mobility  Bed Mobility Comments: NT pt up in bedside recliner- anticipate indep    Seated and Standing Balance:  Balance  Sitting: Intact  Standing: Intact    Functional Endurance:  Activity Tolerance  Activity Tolerance: Patient Tolerated treatment well    D/C Recommendations:  OT D/C RECOMMENDATIONS  REQUIRES OT FOLLOW-UP: No    Equipment Recommendations:  OT Equipment Recommendations  Equipment Needed: No    OT Education:   Patient Education  Education Given To: Patient; Family  Education Provided: Role of Therapy;Plan of Care;IADL Safety  Education Provided Comments: Stroke symptom and home reentry safety educatoin  Education Method: Verbal  Barriers to Learning: None  Education Outcome: Verbalized understanding    OT Follow Up:   OT D/C RECOMMENDATIONS  REQUIRES OT FOLLOW-UP: No       Assessment/Discharge Disposition:  Assessment: Pt is a 78year old man from home who presents to [facility] with symptoms of acute CVA. Following TNK administration pt's symptoms have resolved. No acute OT needs identified.   Prognosis: Good  No Skilled OT: Independent with functional mobility, Independent with ADL's  Decision Making: Low Complexity  History: Pt's medical history is moderately complex  Exam: Pt has no performance deficits  Assistance / Modification: Pt requires no physical assist    AMPAC (Six Click) Self care Score   How much help is needed for putting on and taking off regular lower body clothing?: None  How much help is needed for bathing (which includes washing, rinsing, drying)?: None  How much help is needed for toileting (which includes using toilet, bedpan, or urinal)?: None  How much help is needed for putting on and taking off regular upper body clothing?: None  How much help is needed for taking care of personal grooming?: None  How much help for eating meals?: None  AM-[facility] Inpatient Daily Activity Raw Score: 24  AM-[facility] Inpatient ADL T-Scale Score : 57.54  ADL Inpatient CMS 0-100% Score: 0    Therapy key for assistance levels -   Independent/Mod I = Pt. is able to perform task with no assistance but may require a device   Stand by assistance = Pt. does not perform task at an independent level but does not need physical assistance, requires verbal cues  Minimal, Moderate, Maximal Assistance = Pt. requires physical assistance (25%, 50%, 75% assist from helper) for task but is able to actively participate in task   Dependent = Pt. requires total assistance with task and is not able to actively participate with task completion     Plan:  Occupational Therapy Plan  Times Per Week: No acute OT    Goals:   Patient Goal: Patient goals : \"I want to get home\"      Discussed and agreed upon: Yes Comments:       Therapy Time:   Individual   Time In 1332   Time Out 1343   Minutes 11     Eval: 11 minutes     Electronically signed by:     FERNANDO Michelle,   3/12/2023, 3:01 PM

## 2023-03-12 NOTE — PROGRESS NOTES
Received bedside report from [facility]. Skin assessment completed with night shift RN and myself at bedside. Morning assessment completed and medications given. Repeat head CT cancelled per Dr Adenike Barclay. CTA ordered and escorted pt down to CT scan. Dr Adenike Barclay aware CTA done. While walking pt in hallway at approx 1645 pt began shivering and complaining of being cold. Returned to room and given warm blankets. No fever. Dr [facility] notified, this RN requested order for blood glucose check. No new orders were received. Will continue to monitor patient.

## 2023-03-12 NOTE — PROGRESS NOTES
[facility]   Facility/Department: [facility]  Speech Language Pathology  Clinical Bedside Swallow Evaluation    NAME:Jarrett Lindquist  : 1945 (78 y.o.)   [x]   confirmed    MRN: [id number]  ROOM: [place] [address]  ADMISSION DATE: 3/11/2023  PATIENT DIAGNOSIS(ES): Acute CVA (cerebrovascular accident) ([facility] [address] [I63.9]  No chief complaint on file. Patient Active Problem List    Diagnosis Date Noted    Acute CVA (cerebrovascular accident) ([facility] [address] 2023    S/P CABG (coronary artery bypass graft) 2011    Hypoxemia 2011    CAD (coronary artery disease) 2011    Hypertension 2011     Past Medical History:   Diagnosis Date    Hyperlipidemia      Past Surgical History:   Procedure Laterality Date    CARDIAC SURGERY       No Known Allergies    DATE ONSET: 2023    Date of Evaluation: 3/12/2023   Evaluating Therapist: ARASH Platt    Dysphagia Diagnosis  Dysphagia Diagnosis: Swallow function appears WFL  Dysphagia Impression : WNL oropharyngeal swallowing function. Pt was able to form and clear a cohesive bolus per all presented consistencies. No overt s/s of aspiration are observed. No deficits warranting dysphagia intervention have been identified at this time. Recommended Diet  Recommendations: Self feed  Diet Solids Recommendation: Regular  Liquid Consistency Recommendation: Thin  Recommended Form of Meds: PO  Compensatory Swallowing Strategies : Upright as possible for all oral intake      Reason for Referral  Mya Sequeira was referred for a bedside swallow evaluation to assess the efficiency of his swallow function, identify signs and symptoms of aspiration, identify risk factors, and make recommendations regarding safe dietary consistencies, effective compensatory strategies, and safe eating environment. General  Chart Reviewed: Yes  Subjective  Subjective: Pt is pleasant and cooperative.  He states that his speech and numbness have completely resolved and he does not feel the need for a comprehensive speech-language evaluation. He denies any dysphagia but is agreeable to evaluation. He reports that his speech, language, and cognitive functioning are at baseline. Only swallow evaluation complete this date. Behavior/Cognition: Alert; Cooperative;Pleasant mood  Respiratory Status: Room air  O2 Device: None (Room air)  Communication Observation: Functional  Follows Directions: Complex  Dentition: Adequate  Patient Positioning: Upright in bed  Baseline Vocal Quality: Normal  Prior Dysphagia History: n/a  Consistencies Administered: Regular; Thin - cup;Pureed    Vision and Hearing  Vision  Vision: Within Functional Limits  Hearing  Hearing: Exceptions to [facility]  Hearing Exceptions: Hard of hearing/hearing concerns    Current Diet level  Current Diet : Regular  Current Liquid Diet : Thin    Oral Motor  Labial: No impairment  Dentition: Natural  Oral Hygiene: Clean  Lingual: No impairment  Mandible: No impairment    Oral/Pharyngeal Phase  Oral phase: WNL oral phase of swallow. Pharyngeal Phase: WNL pharyngeal phase of swallow. No overt s/s of aspiration observed. Dysphagia Diagnosis  Dysphagia Diagnosis: Swallow function appears WFL  Dysphagia Impression : WNL oropharyngeal swallowing function. Pt was able to form and clear a cohesive bolus per all presented consistencies. No overt s/s of aspiration are observed. No deficits warranting dysphagia intervention have been identified at this time. Dysphagia Outcome Severity Scale: Level 7: Normal in all situations     Recommendations  Requires SLP Intervention: No  Recommendations: Self feed  D/C Recommendations: No follow up therapy recommended post discharge  Diet Solids Recommendation: Regular  Liquid Consistency Recommendation:  Thin  Compensatory Swallowing Strategies : Upright as possible for all oral intake  Recommended Form of Meds: PO  Duration of Treatment: No deficits warranting dysphagia intervention have been identified at this time. Frequency of Treatment: No deficits warranting dysphagia intervention have been identified at this time. Prognosis  Speech Therapy Prognosis  Prognosis: Excellent    Education  Individuals consulted  Consulted and agree with results and recommendations: Patient;RN  RN Name: [place]    Treatment/Goals   N/a    Safety Devices  Safety Devices  Safety Devices in place: Yes  Type of devices: All fall risk precautions in place  Restraints Initially in Place: No    Pain Assessment  Patient does not c/o pain. Pain Re-assessment  Patient does not c/o pain.       Therapy Time  SLP Individual Minutes  Time In: 7822  Time Out: 8269  Minutes: 16         Signature: Electronically signed by ARASH Gottlieb on 3/12/2023 at 9:02 AM [Follow-Up] : a follow-up visit [TextBox_[id number]] : s/p COVID-19 infection, RADS / Allergy / GERD

## 2023-03-12 NOTE — PLAN OF CARE
Problem: Discharge Planning  Goal: Discharge to home or other facility with appropriate resources  3/11/2023 2355 by Therese Bautista RN  Outcome: Progressing  3/11/2023 1726 by Elaine Blanton RN  Outcome: Progressing     Problem: Skin/Tissue Integrity  Goal: Absence of new skin breakdown  Description: 1. Monitor for areas of redness and/or skin breakdown  2. Assess vascular access sites hourly  3. Every 4-6 hours minimum:  Change oxygen saturation probe site  4. Every 4-6 hours:  If on nasal continuous positive airway pressure, respiratory therapy assess nares and determine need for appliance change or resting period.   3/11/2023 2355 by Therese Bautista RN  Outcome: Progressing  3/11/2023 1726 by Elaine Blanton RN  Outcome: Progressing     Problem: Safety - Adult  Goal: Free from fall injury  3/11/2023 2355 by Therese Bautista RN  Outcome: Progressing  3/11/2023 1726 by Elaine Blanton RN  Outcome: Progressing     Problem: ABCDS Injury Assessment  Goal: Absence of physical injury  3/11/2023 2355 by Therese Bautista RN  Outcome: Progressing  3/11/2023 1726 by Elaine Blanton RN  Outcome: Progressing     Problem: Skin/Tissue Integrity - Adult  Goal: Skin integrity remains intact  Outcome: Progressing  Goal: Oral mucous membranes remain intact  Outcome: Progressing     Problem: Pain  Goal: Verbalizes/displays adequate comfort level or baseline comfort level  Outcome: Progressing

## 2023-03-12 NOTE — CONSULTS
[facility] coverage  Urology consult note for Sunday, 3/12/2023    77-year-old white male  Currently admitted for CVA and referred urologically for the finding of a small left distal ureteral calculus by CT scan    Patient has been previously seen urologically at [facility] about 3 months ago in December at which time scanning showed small reportedly 2 to 3 mm stones in both kidneys with no obstruction.  In the interim, CT scan during this admission shows a reported 7 mm calculus in the left distal ureter    No flank pain currently    CT scan was reviewed personally and shows minimal if any significant hydronephrosis of the left kidney, there are numerous calcifications in the left lower quadrant associated with iliac artery calcifications unclear which is actually within the ureter itself    Lab work unremarkable, normal serum creatinine at 0.9, hemoglobin 13.7 with normal WBC count  Urinalysis shows a few red cells  Currently on aspirin and Plavix anticoagulation  On Flomax 0.4 mg daily    Assessment/impression  Small left distal ureteral calculus, minimal hydronephrosis and clinical symptoms  In view of anticoagulation, normal serum creatinine, minimal hydronephrosis would simply recommend observation for a few days however will increase Flomax to twice a day for possible spontaneous passage  If does not pass within a reasonable time, then urology will proceed with cystoscopy with either placement of a left double-J stent or ureteroscopy with stone manipulation or laser lithotripsy as clinically indicated  Otherwise continue supportive care  Louis D'Amico, MD  Contact via Perfect[employer] or cell/text at [phone]   no dysuria, no frequency, and no hematuria.

## 2023-03-13 ENCOUNTER — TELEPHONE (OUTPATIENT)
Dept: PULMONOLOGY | Age: 78
End: 2023-03-13

## 2023-03-13 VITALS
HEART RATE: 61 BPM | WEIGHT: 167 LBS | DIASTOLIC BLOOD PRESSURE: 67 MMHG | RESPIRATION RATE: 18 BRPM | BODY MASS INDEX: 26.21 KG/M2 | TEMPERATURE: 97.7 F | SYSTOLIC BLOOD PRESSURE: 149 MMHG | OXYGEN SATURATION: 92 % | HEIGHT: 67 IN

## 2023-03-13 DIAGNOSIS — N20.1 URETERAL STONE: Primary | ICD-10-CM

## 2023-03-13 DIAGNOSIS — G47.33 OSA (OBSTRUCTIVE SLEEP APNEA): Primary | ICD-10-CM

## 2023-03-13 LAB
ANION GAP SERPL CALCULATED.3IONS-SCNC: 9 MEQ/L (ref 9–15)
BUN BLDV-MCNC: 20 MG/DL (ref 8–23)
CALCIUM SERPL-MCNC: 8.9 MG/DL (ref 8.5–9.9)
CHLORIDE BLD-SCNC: 108 MEQ/L (ref 95–107)
CO2: 28 MEQ/L (ref 20–31)
CREAT SERPL-MCNC: 0.87 MG/DL (ref 0.7–1.2)
GFR SERPL CREATININE-BSD FRML MDRD: >60 ML/MIN/{1.73_M2}
GLUCOSE BLD-MCNC: 86 MG/DL (ref 70–99)
HCT VFR BLD CALC: 38.5 % (ref 42–52)
HEMOGLOBIN: 13.2 G/DL (ref 14–18)
LV EF: 60 %
LVEF MODALITY: NORMAL
MCH RBC QN AUTO: 31.7 PG (ref 27–31.3)
MCHC RBC AUTO-ENTMCNC: 34.4 % (ref 33–37)
MCV RBC AUTO: 92.1 FL (ref 79–92.2)
PDW BLD-RTO: 13.3 % (ref 11.5–14.5)
PLATELET # BLD: 210 K/UL (ref 130–400)
POTASSIUM REFLEX MAGNESIUM: 4.2 MEQ/L (ref 3.4–4.9)
RBC # BLD: 4.18 M/UL (ref 4.7–6.1)
SODIUM BLD-SCNC: 145 MEQ/L (ref 135–144)
WBC # BLD: 9.8 K/UL (ref 4.8–10.8)

## 2023-03-13 PROCEDURE — 6370000000 HC RX 637 (ALT 250 FOR IP): Performed by: UROLOGY

## 2023-03-13 PROCEDURE — 2580000003 HC RX 258: Performed by: INTERNAL MEDICINE

## 2023-03-13 PROCEDURE — 99232 SBSQ HOSP IP/OBS MODERATE 35: CPT | Performed by: PSYCHIATRY & NEUROLOGY

## 2023-03-13 PROCEDURE — 85027 COMPLETE CBC AUTOMATED: CPT

## 2023-03-13 PROCEDURE — 36415 COLL VENOUS BLD VENIPUNCTURE: CPT

## 2023-03-13 PROCEDURE — APPSS30 APP SPLIT SHARED TIME 16-30 MINUTES: Performed by: NURSE PRACTITIONER

## 2023-03-13 PROCEDURE — 93306 TTE W/DOPPLER COMPLETE: CPT

## 2023-03-13 PROCEDURE — 80048 BASIC METABOLIC PNL TOTAL CA: CPT

## 2023-03-13 PROCEDURE — 99231 SBSQ HOSP IP/OBS SF/LOW 25: CPT | Performed by: PHYSICIAN ASSISTANT

## 2023-03-13 PROCEDURE — 6370000000 HC RX 637 (ALT 250 FOR IP): Performed by: PSYCHIATRY & NEUROLOGY

## 2023-03-13 PROCEDURE — 2500000003 HC RX 250 WO HCPCS

## 2023-03-13 RX ORDER — WATER FOR INJ.,BACTERIOSTATIC
VIAL (ML) INJECTION
Status: COMPLETED
Start: 2023-03-13 | End: 2023-03-13

## 2023-03-13 RX ORDER — TAMSULOSIN HYDROCHLORIDE 0.4 MG/1
0.8 CAPSULE ORAL DAILY
Qty: 30 CAPSULE | Refills: 3 | Status: SHIPPED | OUTPATIENT
Start: 2023-03-14

## 2023-03-13 RX ORDER — CLOPIDOGREL BISULFATE 75 MG/1
75 TABLET ORAL DAILY
Qty: 30 TABLET | Refills: 3 | Status: SHIPPED | OUTPATIENT
Start: 2023-03-14

## 2023-03-13 RX ADMIN — SODIUM CHLORIDE, POTASSIUM CHLORIDE, SODIUM LACTATE AND CALCIUM CHLORIDE: 600; 310; 30; 20 INJECTION, SOLUTION INTRAVENOUS at 03:01

## 2023-03-13 RX ADMIN — BACTERIOSTATIC WATER 30 ML: 1 INJECTION, SOLUTION INTRAMUSCULAR; INTRAVENOUS; SUBCUTANEOUS at 13:25

## 2023-03-13 RX ADMIN — TAMSULOSIN HYDROCHLORIDE 0.8 MG: 0.4 CAPSULE ORAL at 08:53

## 2023-03-13 RX ADMIN — CLOPIDOGREL BISULFATE 75 MG: 75 TABLET, FILM COATED ORAL at 08:53

## 2023-03-13 ASSESSMENT — ENCOUNTER SYMPTOMS
WHEEZING: 0
COUGH: 0
NAUSEA: 0
BACK PAIN: 0
COLOR CHANGE: 0
TROUBLE SWALLOWING: 0
DIARRHEA: 0
VOMITING: 0
SHORTNESS OF BREATH: 0
ABDOMINAL DISTENTION: 0
CHEST TIGHTNESS: 0
ABDOMINAL PAIN: 0
SINUS PRESSURE: 0

## 2023-03-13 ASSESSMENT — PAIN SCALES - GENERAL
PAINLEVEL_OUTOF10: 0

## 2023-03-13 NOTE — PROGRESS NOTES
1900 Received shift report at bedside with Charlette Hawley. Skin assessment completed. Pt has stroke education at bedside. Pt resting comfortably in the chair. Getting up to toilet after call light. 2055 called  to get a call back from Dr Fritz Casillas  2150 second call to  to get a call back from Dr Fritz Casillas  2155 Received phone call from Dr Fritz Casillas, approved transfer. 0200 transfer to [facility] on [address] [facility] report to [place]. Left via wheelchair with telemetry on.

## 2023-03-13 NOTE — DISCHARGE SUMMARY
Hospital Medicine Discharge Summary    Quang Basilio  :  1945  MRN:  [id number]    Admit date:  3/11/2023  Discharge date:  3/13/2023    Admitting Physician:  Nimesh Wade DO  Primary Care Physician:  Karen Win      Discharge Diagnoses:    Left cerebral stroke     No chief complaint on file. Hospital Course:   Patient presented with a Left cerebral stroke s/p TPA. Started on plavix in addition to his aspirin per neurology  recommendations. He was noted to have a 7mm distal ureteral stone for which they recommended flomax. Exam on discharge:   BP (!) 149/67   Pulse 61   Temp 97.7 °F (36.5 °C) (Oral)   Resp 18   Ht 5' 7\" (1.702 m)   Wt 167 lb (75.8 kg)   SpO2 92%   BMI 26.16 kg/m²   General appearance: No apparent distress, appears stated age and cooperative. HEENT: Conjunctivae/corneas clear. Neck: Trachea midline. Respiratory:  Normal respiratory effort. Clear to auscultation  Cardiovascular: Regular rate and rhythm   Abdomen: Soft, non-tender, non-distended with normal bowel sounds. Musculoskeletal: No clubbing, cyanosis or edema bilaterally. Neuro: Non Focal.   Capillary Refill: Brisk,< 3 seconds   Peripheral Pulses: +2 palpable, equal bilaterally     Patient was seen by the following consultants   Consults:  IP CONSULT TO NEUROLOGY  IP CONSULT TO [address] [place] [address] TO CHANGE BASE FLUIDS  IP CONSULT TO CRITICAL CARE  IP CONSULT TO UROLOGY  IP CONSULT TO UROLOGY    Significant Diagnostic Studies:    Refer to chart     Please refer to chart if no studies are shown here    CT ABDOMEN PELVIS WO CONTRAST Additional Contrast? None    Result Date: 3/12/2023  EXAMINATION: CT OF THE ABDOMEN AND PELVIS WITHOUT CONTRAST 3/11/2023 6:11 pm TECHNIQUE: CT of the abdomen and pelvis was performed without the administration of intravenous contrast. Multiplanar reformatted images are provided for review.  Automated exposure control, iterative reconstruction, and/or weight based adjustment of the mA/kV was utilized to reduce the radiation dose to as low as reasonably achievable. COMPARISON: None. HISTORY: ORDERING SYSTEM PROVIDED HISTORY: abdominal pain TECHNOLOGIST PROVIDED HISTORY: Reason for exam:->abdominal pain Additional Contrast?->None What reading provider will be dictating this exam?->CRC FINDINGS: Lower Chest: Lung bases are clear. Organs: Liver without focal lesion. Gallbladder unremarkable. Pancreas and spleen unremarkable. Adrenals without nodule. Kidneys demonstrate normal appearance right kidney. Left kidney has mild-to-moderate left hydronephrosis and left hydroureter extending to the distal left ureter where there is a 7.5 mm calcification density of a distal left ureteral stone located the S2 level. GI/Bowel: Small hiatal hernia. No focal thickening or disproportion dilatation of bowel. No inflammatory findings. Moderate to large distal colonic and rectosigmoid colonic stool burden. Pelvis: No suspicious pelvic lesion or bulky pelvic adenopathy/free fluid. Peritoneum/Retroperitoneum: No bulky retroperitoneal adenopathy. No suspicious peritoneal or mesenteric process Vasculature: Grossly normal caliber of abdominal aorta and vasculature Bones/Soft Tissues: No acute osseous or soft tissue findings. Obstructing left 7 mm distal ureteral stone at the S2 level with upstream mild-to-moderate left hydronephrosis and minimal perinephric stranding. CTA HEAD W WO CONTRAST    Result Date: 3/12/2023  EXAMINATION: CTA OF THE NECK; CTA OF THE HEAD WITHOUT AND WITH CONTRAST 3/12/2023 2:03 pm: TECHNIQUE: CTA of the neck was performed with the administration of intravenous contrast. Multiplanar reformatted images are provided for review. MIP images are provided for review. Stenosis of the internal carotid arteries measured using NASCET criteria.  Automated exposure control, iterative reconstruction, and/or weight based adjustment of the mA/kV was utilized to reduce the radiation dose to as low as reasonably achievable.; CTA of the head/brain was performed without and with the administration of intravenous contrast. Multiplanar reformatted images are provided for review. MIP images are provided for review. Automated exposure control, iterative reconstruction, and/or weight based adjustment of the mA/kV was utilized to reduce the radiation dose to as low as reasonably achievable. Noncontrast CT of the head with reconstructed 2-D images are also provided for review. COMPARISON: None. HISTORY: ORDERING SYSTEM PROVIDED HISTORY: stroke TECHNOLOGIST PROVIDED HISTORY: Reason for exam:->stroke What reading provider will be dictating this exam?->CRC; ORDERING SYSTEM PROVIDED HISTORY: stroke/ sp TPA TECHNOLOGIST PROVIDED HISTORY: Reason for exam:->stroke/ sp TPA What reading provider will be dictating this exam?->CRC FINDINGS: CT HEAD: BRAIN/VENTRICLES:  No acute intracranial hemorrhage or extraaxial fluid collection. Grey-white differentiation is maintained. No evidence of mass, mass effect or midline shift. No evidence of hydrocephalus. ORBITS: The visualized portion of the orbits demonstrate no acute abnormality. SINUSES:  The visualized paranasal sinuses and mastoid air cells demonstrate no acute abnormality. SOFT TISSUES/SKULL: No acute abnormality of the visualized skull or soft tissues. CTA NECK: AORTIC ARCH/ARCH VESSELS: No dissection or arterial injury. No significant stenosis of the brachiocephalic or subclavian arteries. CAROTID ARTERIES: Calcified plaque causes 40% stenosis of the proximal left internal carotid artery. The right internal carotid artery is patent. The common carotid arteries are patent. VERTEBRAL ARTERIES: No dissection, arterial injury, or significant stenosis. SOFT TISSUES: The lung apices are clear. No cervical or superior mediastinal lymphadenopathy. The larynx and pharynx are unremarkable. No acute abnormality of the salivary and thyroid glands.  BONES: No acute osseous abnormality. CTA HEAD: ANTERIOR CIRCULATION: No significant stenosis of the intracranial internal carotid, anterior cerebral, or middle cerebral arteries. No aneurysm. POSTERIOR CIRCULATION: Multifocal severe stenosis of the left posterior cerebral artery P2 segment. Moderate stenosis of the right posterior cerebral artery P3 segment. The basilar and cerebellar arteries are patent. OTHER: No dural venous sinus thrombosis on this non-dedicated study. 1. No large vessel occlusion in the head or neck. 2. Left posterior cerebral artery severe stenosis. 3. Left internal carotid artery 40% stenosis. CT Head W/O Contrast    Addendum Date: 3/11/2023    ADDENDUM: Results called by core team to Dr. Yanely Person at 1:10 p.m. Result Date: 3/11/2023  EXAMINATION: CT OF THE HEAD WITHOUT CONTRAST  3/11/2023 12:48 pm TECHNIQUE: CT of the head was performed without the administration of intravenous contrast. Automated exposure control, iterative reconstruction, and/or weight based adjustment of the mA/kV was utilized to reduce the radiation dose to as low as reasonably achievable. COMPARISON: None. HISTORY: ORDERING SYSTEM PROVIDED HISTORY: Right-sided numbness TECHNOLOGIST PROVIDED HISTORY: Reason for exam:->Right-sided numbness Has a \"code stroke\" or \"stroke alert\" been called? ->Yes What reading provider will be dictating this exam?->CRC FINDINGS: No intracranial hemorrhage. No abnormal extra-axial fluid collections. There is a area of hypoattenuation located in left frontal white matter. Additional confluent areas of decreased attenuation are present in periventricular white matter of frontal lobes. No hydrocephalus. Basilar cisterns are patent. Visualized paranasal sinuses and mastoid air cells are clear. 1. No acute intracranial hemorrhage.  2. Focal area of decreased attenuation associated with left frontal white matter may represent a area of chronic microvascular ischemia versus stroke of uncertain chronicity. MRI could be helpful for further evaluation 3. Additional areas of decreased attenuation in periventricular white matter of both hemispheres suggesting chronic microvascular ischemia. CTA NECK W WO CONTRAST    Result Date: 3/12/2023  EXAMINATION: CTA OF THE NECK; CTA OF THE HEAD WITHOUT AND WITH CONTRAST 3/12/2023 2:03 pm: TECHNIQUE: CTA of the neck was performed with the administration of intravenous contrast. Multiplanar reformatted images are provided for review. MIP images are provided for review. Stenosis of the internal carotid arteries measured using NASCET criteria. Automated exposure control, iterative reconstruction, and/or weight based adjustment of the mA/kV was utilized to reduce the radiation dose to as low as reasonably achievable.; CTA of the head/brain was performed without and with the administration of intravenous contrast. Multiplanar reformatted images are provided for review. MIP images are provided for review. Automated exposure control, iterative reconstruction, and/or weight based adjustment of the mA/kV was utilized to reduce the radiation dose to as low as reasonably achievable. Noncontrast CT of the head with reconstructed 2-D images are also provided for review. COMPARISON: None. HISTORY: ORDERING SYSTEM PROVIDED HISTORY: stroke TECHNOLOGIST PROVIDED HISTORY: Reason for exam:->stroke What reading provider will be dictating this exam?->CRC; ORDERING SYSTEM PROVIDED HISTORY: stroke/ sp TPA TECHNOLOGIST PROVIDED HISTORY: Reason for exam:->stroke/ sp TPA What reading provider will be dictating this exam?->CRC FINDINGS: CT HEAD: BRAIN/VENTRICLES:  No acute intracranial hemorrhage or extraaxial fluid collection. Grey-white differentiation is maintained. No evidence of mass, mass effect or midline shift. No evidence of hydrocephalus. ORBITS: The visualized portion of the orbits demonstrate no acute abnormality.  SINUSES:  The visualized paranasal sinuses and mastoid air cells demonstrate no acute abnormality. SOFT TISSUES/SKULL: No acute abnormality of the visualized skull or soft tissues. CTA NECK: AORTIC ARCH/ARCH VESSELS: No dissection or arterial injury. No significant stenosis of the brachiocephalic or subclavian arteries. CAROTID ARTERIES: Calcified plaque causes 40% stenosis of the proximal left internal carotid artery. The right internal carotid artery is patent. The common carotid arteries are patent. VERTEBRAL ARTERIES: No dissection, arterial injury, or significant stenosis. SOFT TISSUES: The lung apices are clear. No cervical or superior mediastinal lymphadenopathy. The larynx and pharynx are unremarkable. No acute abnormality of the salivary and thyroid glands. BONES: No acute osseous abnormality. CTA HEAD: ANTERIOR CIRCULATION: No significant stenosis of the intracranial internal carotid, anterior cerebral, or middle cerebral arteries. No aneurysm. POSTERIOR CIRCULATION: Multifocal severe stenosis of the left posterior cerebral artery P2 segment. Moderate stenosis of the right posterior cerebral artery P3 segment. The basilar and cerebellar arteries are patent. OTHER: No dural venous sinus thrombosis on this non-dedicated study. 1. No large vessel occlusion in the head or neck. 2. Left posterior cerebral artery severe stenosis. 3. Left internal carotid artery 40% stenosis. MRI brain without contrast    Result Date: 3/11/2023  EXAMINATION: MRI OF THE BRAIN WITHOUT CONTRAST  3/11/2023 4:38 pm TECHNIQUE: Multiplanar multisequence MRI of the brain was performed without the administration of intravenous contrast. COMPARISON: None. HISTORY: ORDERING SYSTEM PROVIDED HISTORY: cva TECHNOLOGIST PROVIDED HISTORY: Reason for exam:->cva What reading provider will be dictating this exam?->CRC FINDINGS: INTRACRANIAL STRUCTURES/VENTRICLES: There is no acute infarct. No mass effect or midline shift. No evidence of an acute intracranial hemorrhage.  Involutional parenchymal changes. No ventriculomegaly. Scattered periventricular, deep, and subcortical white matter T2/FLAIR hyperintensities are nonspecific and likely related to microvascular ischemic disease. The sellar/suprasellar regions appear unremarkable. The normal signal voids within the major intracranial vessels appear maintained. ORBITS: The visualized portion of the orbits demonstrate no acute abnormality. SINUSES: The visualized paranasal sinuses and mastoid air cells demonstrate no acute abnormality. BONES/SOFT TISSUES: The bone marrow signal intensity appears normal. The soft tissues demonstrate no acute abnormality. 1. No acute intracranial abnormality. Specifically, no evidence of acute infarct. 2. Involutional parenchymal changes with mild microvascular ischemic disease. Discharge Medications:         Medication List        START taking these medications      clopidogrel 75 MG tablet  Commonly known as: PLAVIX  Take 1 tablet by mouth daily  Start taking on: March 14, 2023     tamsulosin 0.4 MG capsule  Commonly known as: FLOMAX  Take 2 capsules by mouth daily  Start taking on: March 14, 2023            CHANGE how you take these medications      aspirin EC 81 MG EC tablet  What changed: Another medication with the same name was removed. Continue taking this medication, and follow the directions you see here.             CONTINUE taking these medications      Ginkgo Biloba 120 MG Caps     Glucosamine Chond Cmp Triple Tabs     MULTIVITAMIN ADULT EXTRA C PO     simvastatin 40 MG tablet  Commonly known as: ZOCOR     VITAMIN C (CALCIUM ASCORBATE) PO               Where to Get Your Medications        These medications were sent to [address]      Phone: [phone]   tamsulosin 0.4 MG capsule       These medications were sent to [facility], [address] [place] [address] [address] [facility] Rachelle Almanza [phone]  [address] [place] [address]      Phone: [phone]   clopidogrel 75 MG tablet         Disposition:   If discharged to Home, Any [place]ega needs that were indicated and/or required as been addressed and set up by Social Work. Condition at discharge: good     Activity: activity as tolerated    Total time taken for discharging this patient: 40 minutes. Greater than 70% of time was spent focused exclusively on this patient. Time was taken to review chart, discuss plans with consultants, reconciling medications, discussing plan answering questions with patient.      Signed:  Kervin Murphy MD  3/13/2023, 4:03 PM  ----------------------------------------------------------------------------------------------------------------------    Issac popexpert,

## 2023-03-13 NOTE — TELEPHONE ENCOUNTER
Admitted for stroke, will need home sleep study. Will order today. He can have a phone visit with me after it is done to go over results.   Thanks

## 2023-03-13 NOTE — PLAN OF CARE
Problem: Discharge Planning  Goal: Discharge to home or other facility with appropriate resources  Outcome: Progressing     Problem: Skin/Tissue Integrity  Goal: Absence of new skin breakdown  Description: 1. Monitor for areas of redness and/or skin breakdown  2. Assess vascular access sites hourly  3. Every 4-6 hours minimum:  Change oxygen saturation probe site  4. Every 4-6 hours:  If on nasal continuous positive airway pressure, respiratory therapy assess nares and determine need for appliance change or resting period. Outcome: Progressing     Problem: Safety - Adult  Goal: Free from fall injury  Outcome: Progressing     Problem: ABCDS Injury Assessment  Goal: Absence of physical injury  Outcome: Progressing     Problem: Skin/Tissue Integrity - Adult  Goal: Skin integrity remains intact  Outcome: Progressing     Problem: Pain  Goal: Verbalizes/displays adequate comfort level or baseline comfort level  Outcome: Progressing     Problem: Genitourinary - Adult  Goal: Absence of urinary retention  Outcome: Progressing     Problem: Occupational Therapy - Adult  Goal: By Discharge: Performs self-care activities at highest level of function for planned discharge setting. See evaluation for individualized goals.   3/12/2023 1503 by Rafael Ortiz OTR/L  Outcome: Progressing

## 2023-03-13 NOTE — FLOWSHEET NOTE
Am nursing  assessment completed. Pt :    awake and resting quietly           Alert and oriented. Breakfast: completed without difficulty  RESP:     even and non labored          Lung sounds:    clear                             Oxygen: room air  Complaints of: denies  Pain: denies  IV: LR at 100ml/hr  TELE: NSR  Dressings:   Precautions:              Falls:  35      Hernan: 21  Chart and meds reviewed. Noted Labs:  na 145 k 4.2 bun 20 cr0.87 w 9.8 h 13.2  Plan for today:    Call light in reach. NOTES:  Pt states he is anxious for discharge. Pt for echo today. Urology following for stone. 200ml clear yellow urine strained. No complaints. Electronically signed by Monroe Vásquez RN on 3/13/2023 at 9:30 AM    [id number] ambulatory around unit. Dr Walterine Babinski by for rounds. Pt for echo and discharge today. Pt to be seen by urology this am also. Electronically signed by Monroe Vásquez RN on 3/13/2023 at 10:50 AM    1332 bedside echo completed. Electronically signed by Monroe Vásquez RN on 3/13/2023 at 1:32 PM    [id number] verified ok to DC per NEURO. Iv and tele removed for discharge. Care plan completed for discharge. Appropriate education addressed in discharge instructions. Instructions completed and reviewed with patient. Verbalize understanding of instructions and follow up. Personal belongings gathered. No complaints. Transport wheelchair called. Family member present. Pt meds sent to pharmacy of choice per [facility].  Electronically signed by Monroe Vásquez RN on 3/13/2023 at 2:26 PM

## 2023-03-13 NOTE — CARE COORDINATION
Case Management Assessment  Initial Evaluation    Date/Time of Evaluation: 3/13/2023 11:26 AM  Assessment Completed by: Leidy Wilkins RN    If patient is discharged prior to next notation, then this note serves as note for discharge by case management. Patient Name: Yunior Molina                   YOB: 1945  Diagnosis: Acute CVA (cerebrovascular accident) [facility]) [I63.9]                   Date / Time: 3/11/2023  3:15 PM    Patient Admission Status: Inpatient   Readmission Risk (Low < 19, Mod (19-27), High > 27): Readmission Risk Score: 9.2    Current PCP: Johnny Michel  PCP verified by CM? Yes    Chart Reviewed: Yes      History Provided by:    Patient Orientation: Alert and Oriented    Patient Cognition: Alert    Hospitalization in the last 30 days (Readmission):  No    If yes, Readmission Assessment in CM Navigator will be completed. Advance Directives:      Code Status: Full Code   Patient's Primary Decision Maker is: Named in [address] [place] [address]      Discharge Planning:    Patient lives with: Spouse/Significant Other Type of Home: House  Primary Care Giver: Self  Patient Support Systems include: Spouse/Significant Other   Current Financial resources: Medicare  Current community resources: None  Current services prior to admission: None            Current DME:  NONE             Type of Home Care services:  None    ADLS  Prior functional level: Independent in ADLs/IADLs  Current functional level: Assistance with the following:, Bathing, Dressing, Toileting    PT AM-PAC: 24 /24  OT AM-PAC: 24 /24    Family can provide assistance at DC: Yes  Would you like Case Management to discuss the discharge plan with any other family members/significant others, and if so, who?  Yes (SPOUSE)  Plans to Return to Present Housing: Yes  Other Identified Issues/Barriers to RETURNING to current housing: ECHOCARDIOGRAM   Potential Assistance needed at discharge: N/A            Potential DME:    Patient expects to discharge to: [facility] for transportation at discharge:      Financial    Payor: Ginette Watterskell / Plan: MEDICARE PART A AND B / Product Type: *No Product type* /     Does insurance require precert for SNF: No    Potential assistance Purchasing Medications: No  Meds-to-Beds request: Yes      [address] [address] [address]  [address] [facility]  Phone: [phone] Fax: [phone] [id number] - [address] [place] [address] [phone] Amilcar Kee [phone]  [address] [facility] [address]  Phone: [phone] Fax: [phone]      Notes:    Factors facilitating achievement of predicted outcomes: Family support, Cooperative, and Pleasant    Barriers to discharge: Stairs at home    Additional Case Management Notes: MET W/PT TO [facility]. PT IS HOME W/SPOUSE INDEPENDENT OF ADLS AND IADLS. PT  DENIES NEEDS. PLAN IS HOME TODAY AFTER ECHO. WILL FOLLOW. The Plan for Transition of Care is related to the following treatment goals of Acute CVA (cerebrovascular accident) ([facility] [address] [W69.9]    IF APPLICABLE: The Patient and/or patient representative Mitch Messer and his family were provided with a choice of provider and agrees with the discharge plan. Freedom of choice list with basic dialogue that supports the patient's individualized plan of care/goals and shares the quality data associated with the providers was provided to:     Patient Representative Name:       The Patient and/or Patient Representative Agree with the Discharge Plan?       Miguel Angel Mata RN  Case Management Department  Ph: [phone] Fax: [phone]

## 2023-03-13 NOTE — CARE COORDINATION
MET W/PT TO ASSESS NEEDS AND DISCUSS DISCHARGE PLAN WHICH IS HOME W/SPOUSE AFTER ECHOCARDIOGRAM. PT FUNCTIONS INDEPENDENTLY AND DENIES NEEDS. NO  DME. WILL FOLLOW.

## 2023-03-13 NOTE — DISCHARGE INSTR - DIET

## 2023-03-13 NOTE — PROGRESS NOTES
Progress Note    3/13/2023   10:46 AM    Name:  Khari Osorio  MRN:    [id number]     Gillian:     [de-identified]   Room:  [facility] Day: 2     Admit Date: 3/11/2023  3:15 PM  PCP: Cameron DUBOIS    Subjective:     C/C: No chief complaint on file. Interval History: Status: not changed. 78year old male who was found to have an obstructing left 7 mm ureteral stone with mild to moderate left hydronephrosis. Denies any pain at this moment. Wants to go home. Past Medical History:   Diagnosis Date    Hyperlipidemia        ROS:  Review of Systems   Constitutional:  Negative for activity change, appetite change, chills, fever and unexpected weight change. HENT:  Negative for drooling, ear pain, nosebleeds, sinus pressure and trouble swallowing. Respiratory:  Negative for cough, chest tightness and shortness of breath. Cardiovascular:  Negative for chest pain and leg swelling. Gastrointestinal:  Negative for abdominal pain, diarrhea and vomiting. Endocrine: Negative for polydipsia and polyphagia. Genitourinary:  Negative for dysuria, flank pain and frequency. Musculoskeletal:  Negative for back pain and myalgias. Skin:  Negative for pallor and rash. Neurological:  Negative for syncope, weakness and headaches. Hematological:  Does not bruise/bleed easily. Psychiatric/Behavioral:  Negative for agitation, behavioral problems and confusion. All other systems reviewed and are negative. Medications:      Allergies: No Known Allergies    Current Meds: clopidogrel (PLAVIX) tablet 75 mg, Daily  tamsulosin (FLOMAX) capsule 0.8 mg, Daily  ondansetron (ZOFRAN-ODT) disintegrating tablet 4 mg, Q8H PRN   Or  ondansetron (ZOFRAN) injection 4 mg, Q6H PRN  polyethylene glycol (GLYCOLAX) packet 17 g, Daily PRN  aspirin EC tablet 81 mg, Daily   Or  aspirin suppository 300 mg, Daily  acetaminophen (TYLENOL) tablet 650 mg, Q4H PRN   Or  acetaminophen (TYLENOL) suppository 650 mg, Q4H PRN  labetalol (NORMODYNE;TRANDATE) injection 10 mg, Q10 Min PRN  hydrALAZINE (APRESOLINE) injection 10 mg, Q30 Min PRN  lidocaine 4 % external patch 1 patch, Daily  morphine (PF) injection 1 mg, Q4H PRN  lactated ringers IV soln infusion, Continuous  rosuvastatin (CRESTOR) tablet 40 mg, Nightly        Data:     Code Status:  Full Code    No family history on file. Social History     Socioeconomic History    Marital status:      Spouse name: Not on file    Number of children: Not on file    Years of education: Not on file    Highest education level: Not on file   Occupational History    Not on file   Tobacco Use    Smoking status: Never     Passive exposure: Never    Smokeless tobacco: Never   Vaping Use    Vaping Use: Never used   Substance and Sexual Activity    Alcohol use: Never    Drug use: Never    Sexual activity: Yes     Partners: Female   Other Topics Concern    Not on file   Social History Narrative    Not on file     Social Determinants of Health     Financial Resource Strain: Not on file   Food Insecurity: Not on file   Transportation Needs: Not on file   Physical Activity: Not on file   Stress: Not on file   Social Connections: Not on file   Intimate Partner Violence: Not on file   Housing Stability: Not on file       Vitals:  BP (!) 149/67   Pulse 61   Temp 97.7 °F (36.5 °C) (Oral)   Resp 18   Ht 5' 7\" (1.702 m)   Wt 167 lb (75.8 kg)   SpO2 92%   BMI 26.16 kg/m²   Temp (24hrs), Av.3 °F (36.8 °C), Min:97.7 °F (36.5 °C), Max:98.8 °F (37.1 °C)    Recent Labs     23  1250   POCGLU 112*       I/O(24Hr):     Intake/Output Summary (Last 24 hours) at 3/13/2023 1046  Last data filed at 3/13/2023 0931  Gross per 24 hour   Intake 1931.88 ml   Output 250 ml   Net 1681.88 ml       Labs:  Hematology:  Recent Labs     23  1252 23  1838 23  0542   WBC 6.9   < > 9.8   HGB 14.5   < > 13.2*   HCT 42.6   < > 38.5*      < > 210   PROTIME 13.4  --   --    INR 1.0  --   --    APTT 41.7* --   --     < > = values in this interval not displayed. Chemistry:  Recent Labs     03/11/23  1252 03/12/23  0430 03/13/23  0542      < > 145*   K 4.4   < > 4.2      < > 108*   CO2 29   < > 28   GLUCOSE 104*   < > 86   BUN 15   < > 20   CREATININE 0.89   < > 0.87   MG 2.2  --   --    ANIONGAP 9   < > 9   LABGLOM >60.0   < > >60.0   CALCIUM 9.9   < > 8.9   TROPONINI <0.010  --   --     < > = values in this interval not displayed. Urine CultureNo results found for: LABURIN      Physical Examination:        Physical Exam  Vitals and nursing note reviewed. Constitutional:       General: He is awake. He is not in acute distress. Appearance: Normal appearance. He is well-developed and normal weight. He is not ill-appearing, toxic-appearing or diaphoretic. Comments: No photophobia. No phonophobia. HENT:      Head: Normocephalic and atraumatic. No Mullins's sign. Right Ear: External ear normal.      Left Ear: External ear normal.      Nose: Nose normal. No congestion or rhinorrhea. Mouth/Throat:      Mouth: Mucous membranes are moist.      Pharynx: Oropharynx is clear. No oropharyngeal exudate or posterior oropharyngeal erythema. Eyes:      General: No scleral icterus. Right eye: No foreign body or discharge. Left eye: No discharge. Extraocular Movements: Extraocular movements intact. Conjunctiva/sclera: Conjunctivae normal.      Left eye: No exudate. Pupils: Pupils are equal, round, and reactive to light. Neck:      Vascular: No JVD. Trachea: No tracheal deviation. Comments: No meningismus. Cardiovascular:      Rate and Rhythm: Normal rate and regular rhythm. Heart sounds: Normal heart sounds. Heart sounds not distant. No murmur heard. No friction rub. No gallop. Pulmonary:      Effort: Pulmonary effort is normal. No respiratory distress. Breath sounds: Normal breath sounds. No stridor. No wheezing, rhonchi or rales. Chest:      Chest wall: No tenderness. Abdominal:      General: Abdomen is flat. Bowel sounds are normal. There is no distension. Palpations: Abdomen is soft. There is no mass. Tenderness: There is no abdominal tenderness. There is no right CVA tenderness, left CVA tenderness, guarding or rebound. Hernia: No hernia is present. Musculoskeletal:         General: No swelling, tenderness, deformity or signs of injury. Normal range of motion. Cervical back: Normal range of motion and neck supple. No rigidity. Lymphadenopathy:      Head:      Right side of head: No submental adenopathy. Left side of head: No submental adenopathy. Skin:     General: Skin is warm and dry. Capillary Refill: Capillary refill takes less than 2 seconds. Coloration: Skin is not jaundiced or pale. Findings: No bruising, erythema, lesion or rash. Neurological:      General: No focal deficit present. Mental Status: He is alert and oriented to person, place, and time. Mental status is at baseline. Cranial Nerves: No cranial nerve deficit. Sensory: No sensory deficit. Motor: No weakness. Coordination: Coordination normal.      Deep Tendon Reflexes: Reflexes are normal and symmetric. Psychiatric:         Mood and Affect: Mood normal.         Behavior: Behavior normal. Behavior is cooperative. Thought Content:  Thought content normal.         Judgment: Judgment normal.       Assessment:        Primary Problem  Acute CVA (cerebrovascular accident) [facility])     Active Hospital Problems    Diagnosis Date Noted    TIA (transient ischemic attack) [G45.9] 03/12/2023     Priority: Medium    Received intravenous tissue plasminogen activator (tPA) in emergency department [Z92.82] 03/12/2023     Priority: Medium    [facility] [I63.9] 03/11/2023     Priority: Medium         Plan:        Obstructing left 7 mm distal ureteral stone with mild to moderate left hydronephrosis-on flomax      Electronically signed by ANA MARÍA Lopez on 3/13/2023 at 10:46 AM

## 2023-03-21 ENCOUNTER — TELEPHONE (OUTPATIENT)
Dept: NEUROLOGY | Age: 78
End: 2023-03-21

## 2023-03-21 NOTE — TELEPHONE ENCOUNTER
Patient was d/c 8 days for TIA, he was put on plavix 75 mg but also had a kidney stone and was put on flomax. He states that he was playing softball and felt dizzy and out of breath, he states that it passed and he was fine, then today he was climbing the stairs and felt dizzy again and out of breath. He took his BP and it was 107/64 pulse 74 then 1 hour later it was 133/72 pulse 71 . He states that while in the hospital nurses noticed that his BP was running low.     Please advise    Call [phone]

## 2023-03-23 ENCOUNTER — OFFICE VISIT (OUTPATIENT)
Dept: UROLOGY | Age: 78
End: 2023-03-23
Payer: MEDICARE

## 2023-03-23 ENCOUNTER — HOSPITAL ENCOUNTER (OUTPATIENT)
Dept: GENERAL RADIOLOGY | Age: 78
Discharge: HOME | End: 2023-03-25
Payer: MEDICARE

## 2023-03-23 VITALS
SYSTOLIC BLOOD PRESSURE: 110 MMHG | OXYGEN SATURATION: 96 % | HEIGHT: 67 IN | DIASTOLIC BLOOD PRESSURE: 70 MMHG | HEART RATE: 76 BPM | BODY MASS INDEX: 24.33 KG/M2 | WEIGHT: 155 LBS

## 2023-03-23 DIAGNOSIS — R31.9 HEMATURIA, UNSPECIFIED TYPE: Primary | ICD-10-CM

## 2023-03-23 DIAGNOSIS — N20.0 KIDNEY STONE: ICD-10-CM

## 2023-03-23 DIAGNOSIS — N20.1 URETERAL STONE: ICD-10-CM

## 2023-03-23 LAB
BILIRUBIN, POC: ABNORMAL
BLOOD URINE, POC: ABNORMAL
CLARITY, POC: CLEAR
COLOR, POC: YELLOW
GLUCOSE URINE, POC: ABNORMAL
KETONES, POC: ABNORMAL
LEUKOCYTE EST, POC: ABNORMAL
NITRITE, POC: ABNORMAL
PH, POC: 5.5
PROTEIN, POC: ABNORMAL
SPECIFIC GRAVITY, POC: <=1.005
UROBILINOGEN, POC: 0.2

## 2023-03-23 PROCEDURE — 3017F COLORECTAL CA SCREEN DOC REV: CPT | Performed by: PHYSICIAN ASSISTANT

## 2023-03-23 PROCEDURE — 74018 RADEX ABDOMEN 1 VIEW: CPT

## 2023-03-23 PROCEDURE — 1123F ACP DISCUSS/DSCN MKR DOCD: CPT | Performed by: PHYSICIAN ASSISTANT

## 2023-03-23 PROCEDURE — 1111F DSCHRG MED/CURRENT MED MERGE: CPT | Performed by: PHYSICIAN ASSISTANT

## 2023-03-23 PROCEDURE — G8420 CALC BMI NORM PARAMETERS: HCPCS | Performed by: PHYSICIAN ASSISTANT

## 2023-03-23 PROCEDURE — G8484 FLU IMMUNIZE NO ADMIN: HCPCS | Performed by: PHYSICIAN ASSISTANT

## 2023-03-23 PROCEDURE — 3078F DIAST BP <80 MM HG: CPT | Performed by: PHYSICIAN ASSISTANT

## 2023-03-23 PROCEDURE — 1036F TOBACCO NON-USER: CPT | Performed by: PHYSICIAN ASSISTANT

## 2023-03-23 PROCEDURE — 99203 OFFICE O/P NEW LOW 30 MIN: CPT | Performed by: PHYSICIAN ASSISTANT

## 2023-03-23 PROCEDURE — G8427 DOCREV CUR MEDS BY ELIG CLIN: HCPCS | Performed by: PHYSICIAN ASSISTANT

## 2023-03-23 PROCEDURE — 3074F SYST BP LT 130 MM HG: CPT | Performed by: PHYSICIAN ASSISTANT

## 2023-03-23 PROCEDURE — 81003 URINALYSIS AUTO W/O SCOPE: CPT | Performed by: PHYSICIAN ASSISTANT

## 2023-03-23 RX ORDER — ALFUZOSIN HYDROCHLORIDE 10 MG/1
10 TABLET, EXTENDED RELEASE ORAL DAILY
Qty: 30 TABLET | Refills: 3 | Status: SHIPPED | OUTPATIENT
Start: 2023-03-23

## 2023-03-23 ASSESSMENT — ENCOUNTER SYMPTOMS
SHORTNESS OF BREATH: 0
COUGH: 0
TROUBLE SWALLOWING: 0
ABDOMINAL PAIN: 0
DIARRHEA: 0
BACK PAIN: 0
VOMITING: 0
CHEST TIGHTNESS: 0
SINUS PRESSURE: 0

## 2023-03-23 NOTE — PROGRESS NOTES
oropharyngeal exudate or posterior oropharyngeal erythema. Eyes:      General: No scleral icterus. Right eye: No foreign body or discharge. Left eye: No discharge. Extraocular Movements: Extraocular movements intact. Conjunctiva/sclera: Conjunctivae normal.      Left eye: No exudate. Pupils: Pupils are equal, round, and reactive to light. Neck:      Vascular: No JVD. Trachea: No tracheal deviation. Comments: No meningismus. Cardiovascular:      Rate and Rhythm: Normal rate and regular rhythm. Heart sounds: Normal heart sounds. Heart sounds not distant. No murmur heard. No friction rub. No gallop. Pulmonary:      Effort: Pulmonary effort is normal. No respiratory distress. Breath sounds: Normal breath sounds. No stridor. No wheezing, rhonchi or rales. Chest:      Chest wall: No tenderness. Abdominal:      General: Abdomen is flat. Bowel sounds are normal. There is no distension. Palpations: Abdomen is soft. There is no mass. Tenderness: There is no abdominal tenderness. There is no right CVA tenderness, left CVA tenderness, guarding or rebound. Hernia: No hernia is present. Musculoskeletal:         General: No swelling, tenderness, deformity or signs of injury. Normal range of motion. Cervical back: Normal range of motion and neck supple. No rigidity. Lymphadenopathy:      Head:      Right side of head: No submental adenopathy. Left side of head: No submental adenopathy. Skin:     General: Skin is warm and dry. Capillary Refill: Capillary refill takes less than 2 seconds. Coloration: Skin is not jaundiced or pale. Findings: No bruising, erythema, lesion or rash. Neurological:      General: No focal deficit present. Mental Status: He is alert and oriented to person, place, and time. Mental status is at baseline. Cranial Nerves: No cranial nerve deficit. Sensory: No sensory deficit.       Motor:

## 2023-03-31 ENCOUNTER — HOSPITAL ENCOUNTER (OUTPATIENT)
Dept: GENERAL RADIOLOGY | Age: 78
End: 2023-03-31
Payer: MEDICARE

## 2023-03-31 ENCOUNTER — OFFICE VISIT (OUTPATIENT)
Dept: UROLOGY | Age: 78
End: 2023-03-31
Payer: MEDICARE

## 2023-03-31 VITALS
WEIGHT: 158 LBS | BODY MASS INDEX: 24.8 KG/M2 | OXYGEN SATURATION: 98 % | HEIGHT: 67 IN | SYSTOLIC BLOOD PRESSURE: 124 MMHG | HEART RATE: 77 BPM | DIASTOLIC BLOOD PRESSURE: 64 MMHG

## 2023-03-31 DIAGNOSIS — N20.0 KIDNEY STONE: ICD-10-CM

## 2023-03-31 DIAGNOSIS — N20.0 KIDNEY STONE: Primary | ICD-10-CM

## 2023-03-31 PROCEDURE — 99213 OFFICE O/P EST LOW 20 MIN: CPT | Performed by: PHYSICIAN ASSISTANT

## 2023-03-31 PROCEDURE — G8427 DOCREV CUR MEDS BY ELIG CLIN: HCPCS | Performed by: PHYSICIAN ASSISTANT

## 2023-03-31 PROCEDURE — 3078F DIAST BP <80 MM HG: CPT | Performed by: PHYSICIAN ASSISTANT

## 2023-03-31 PROCEDURE — 3074F SYST BP LT 130 MM HG: CPT | Performed by: PHYSICIAN ASSISTANT

## 2023-03-31 PROCEDURE — 3017F COLORECTAL CA SCREEN DOC REV: CPT | Performed by: PHYSICIAN ASSISTANT

## 2023-03-31 PROCEDURE — 1123F ACP DISCUSS/DSCN MKR DOCD: CPT | Performed by: PHYSICIAN ASSISTANT

## 2023-03-31 PROCEDURE — G8420 CALC BMI NORM PARAMETERS: HCPCS | Performed by: PHYSICIAN ASSISTANT

## 2023-03-31 PROCEDURE — 1036F TOBACCO NON-USER: CPT | Performed by: PHYSICIAN ASSISTANT

## 2023-03-31 PROCEDURE — 1111F DSCHRG MED/CURRENT MED MERGE: CPT | Performed by: PHYSICIAN ASSISTANT

## 2023-03-31 PROCEDURE — 74018 RADEX ABDOMEN 1 VIEW: CPT

## 2023-03-31 PROCEDURE — G8484 FLU IMMUNIZE NO ADMIN: HCPCS | Performed by: PHYSICIAN ASSISTANT

## 2023-03-31 RX ORDER — SIMVASTATIN 80 MG
TABLET ORAL
COMMUNITY
Start: 2023-03-30

## 2023-03-31 RX ORDER — PREDNISONE 10 MG/1
TABLET ORAL
COMMUNITY
Start: 2022-07-19

## 2023-03-31 RX ORDER — TERBINAFINE HYDROCHLORIDE 250 MG/1
TABLET ORAL
COMMUNITY
Start: 2022-07-28

## 2023-03-31 ASSESSMENT — PATIENT HEALTH QUESTIONNAIRE - PHQ9
2. FEELING DOWN, DEPRESSED OR HOPELESS: 0
SUM OF ALL RESPONSES TO PHQ QUESTIONS 1-9: 0
1. LITTLE INTEREST OR PLEASURE IN DOING THINGS: 0
SUM OF ALL RESPONSES TO PHQ9 QUESTIONS 1 & 2: 0
SUM OF ALL RESPONSES TO PHQ QUESTIONS 1-9: 0

## 2023-03-31 ASSESSMENT — ENCOUNTER SYMPTOMS
ABDOMINAL PAIN: 0
SINUS PRESSURE: 0
SHORTNESS OF BREATH: 0
BACK PAIN: 0
DIARRHEA: 0
TROUBLE SWALLOWING: 0
CHEST TIGHTNESS: 0
COUGH: 0
VOMITING: 0

## 2023-03-31 NOTE — PROGRESS NOTES
Chaperone for Intimate Exam    1. Was chaperone offered as part of the rooming process? offered, declined   2. If Chaperone is declined by patient, NA: chaperone was available and exam completed  3.  Chaperone is n/a
the computerized patient record.     ANA MARÍA Lara

## 2023-04-10 PROBLEM — M20.42 HAMMER TOE OF LEFT FOOT: Status: ACTIVE | Noted: 2020-10-08

## 2023-04-10 PROBLEM — M79.675 PAIN OF TOE OF LEFT FOOT: Status: ACTIVE | Noted: 2020-10-08

## 2023-04-10 PROBLEM — E78.00 PURE HYPERCHOLESTEROLEMIA: Status: ACTIVE | Noted: 2018-01-16

## 2023-04-13 ENCOUNTER — TELEPHONE (OUTPATIENT)
Dept: NEUROLOGY | Age: 78
End: 2023-04-13

## 2023-04-18 ENCOUNTER — HOSPITAL ENCOUNTER (OUTPATIENT)
Dept: LAB | Age: 78
Discharge: HOME | End: 2023-04-18
Payer: MEDICARE

## 2023-04-18 ENCOUNTER — OFFICE VISIT (OUTPATIENT)
Dept: NEUROLOGY | Age: 78
End: 2023-04-18
Payer: MEDICARE

## 2023-04-18 VITALS
BODY MASS INDEX: 25.45 KG/M2 | SYSTOLIC BLOOD PRESSURE: 136 MMHG | HEART RATE: 59 BPM | DIASTOLIC BLOOD PRESSURE: 78 MMHG | WEIGHT: 162.5 LBS

## 2023-04-18 DIAGNOSIS — Z92.82 RECEIVED INTRAVENOUS TISSUE PLASMINOGEN ACTIVATOR (TPA) IN EMERGENCY DEPARTMENT: ICD-10-CM

## 2023-04-18 DIAGNOSIS — G45.9 TIA (TRANSIENT ISCHEMIC ATTACK): ICD-10-CM

## 2023-04-18 DIAGNOSIS — I63.9 ACUTE CVA (CEREBROVASCULAR ACCIDENT) (HCC): Primary | ICD-10-CM

## 2023-04-18 DIAGNOSIS — I63.9 ACUTE CVA (CEREBROVASCULAR ACCIDENT) (HCC): ICD-10-CM

## 2023-04-18 DIAGNOSIS — T14.8XXA BRUISING: ICD-10-CM

## 2023-04-18 LAB
APTT PPP: 42 SEC (ref 24.4–36.8)
BASOPHILS # BLD: 0.1 K/UL (ref 0–0.2)
BASOPHILS NFR BLD: 0.9 %
EOSINOPHIL # BLD: 0.1 K/UL (ref 0–0.7)
EOSINOPHIL NFR BLD: 1.7 %
ERYTHROCYTE [DISTWIDTH] IN BLOOD BY AUTOMATED COUNT: 13 % (ref 11.5–14.5)
HCT VFR BLD AUTO: 41.5 % (ref 42–52)
HGB BLD-MCNC: 14.3 G/DL (ref 14–18)
INR PPP: 1
LYMPHOCYTES # BLD: 1.6 K/UL (ref 1–4.8)
LYMPHOCYTES NFR BLD: 24.1 %
MCH RBC QN AUTO: 32.3 PG (ref 27–31.3)
MCHC RBC AUTO-ENTMCNC: 34.4 % (ref 33–37)
MCV RBC AUTO: 93.7 FL (ref 79–92.2)
MONOCYTES # BLD: 0.6 K/UL (ref 0.2–0.8)
MONOCYTES NFR BLD: 9.3 %
NEUTROPHILS # BLD: 4.2 K/UL (ref 1.4–6.5)
NEUTS SEG NFR BLD: 64 %
PLATELET # BLD AUTO: 220 K/UL (ref 130–400)
PROTHROMBIN TIME: 13.4 SEC (ref 12.3–14.9)
RBC # BLD AUTO: 4.43 M/UL (ref 4.7–6.1)
WBC # BLD AUTO: 6.5 K/UL (ref 4.8–10.8)

## 2023-04-18 PROCEDURE — 85025 COMPLETE CBC W/AUTO DIFF WBC: CPT

## 2023-04-18 PROCEDURE — G8417 CALC BMI ABV UP PARAM F/U: HCPCS | Performed by: PSYCHIATRY & NEUROLOGY

## 2023-04-18 PROCEDURE — 85730 THROMBOPLASTIN TIME PARTIAL: CPT

## 2023-04-18 PROCEDURE — 1123F ACP DISCUSS/DSCN MKR DOCD: CPT | Performed by: PSYCHIATRY & NEUROLOGY

## 2023-04-18 PROCEDURE — G8427 DOCREV CUR MEDS BY ELIG CLIN: HCPCS | Performed by: PSYCHIATRY & NEUROLOGY

## 2023-04-18 PROCEDURE — 1036F TOBACCO NON-USER: CPT | Performed by: PSYCHIATRY & NEUROLOGY

## 2023-04-18 PROCEDURE — 3074F SYST BP LT 130 MM HG: CPT | Performed by: PSYCHIATRY & NEUROLOGY

## 2023-04-18 PROCEDURE — 3017F COLORECTAL CA SCREEN DOC REV: CPT | Performed by: PSYCHIATRY & NEUROLOGY

## 2023-04-18 PROCEDURE — 99214 OFFICE O/P EST MOD 30 MIN: CPT | Performed by: PSYCHIATRY & NEUROLOGY

## 2023-04-18 PROCEDURE — 3078F DIAST BP <80 MM HG: CPT | Performed by: PSYCHIATRY & NEUROLOGY

## 2023-04-18 PROCEDURE — 36415 COLL VENOUS BLD VENIPUNCTURE: CPT

## 2023-04-18 PROCEDURE — 85610 PROTHROMBIN TIME: CPT

## 2023-04-18 RX ORDER — CLOPIDOGREL BISULFATE 75 MG/1
75 TABLET ORAL DAILY
Qty: 90 TABLET | Refills: 1 | Status: SHIPPED | OUTPATIENT
Start: 2023-04-18

## 2023-04-18 ASSESSMENT — ENCOUNTER SYMPTOMS
BACK PAIN: 0
PHOTOPHOBIA: 0
COLOR CHANGE: 0
SHORTNESS OF BREATH: 0
VOMITING: 0
CHOKING: 0
TROUBLE SWALLOWING: 0
NAUSEA: 0

## 2023-07-19 NOTE — TELEPHONE ENCOUNTER
Patient is requesting medication refill.  Please approve or deny this request.    Rx requested:  Requested Prescriptions     Pending Prescriptions Disp Refills    clopidogrel (PLAVIX) 75 MG tablet 90 tablet 0     Sig: Take 1 tablet by mouth daily         Last Office Visit:   4/18/2023      Next Visit Date:  Future Appointments   Date Time Provider [address] [place] [address]   8/29/2023  2:00 PM Jagjit Nix MD [address] Neurology -

## 2023-07-20 RX ORDER — CLOPIDOGREL BISULFATE 75 MG/1
75 TABLET ORAL DAILY
Qty: 30 TABLET | Refills: 0 | Status: SHIPPED | OUTPATIENT
Start: 2023-07-20 | End: 2023-10-18

## 2023-08-17 RX ORDER — CLOPIDOGREL BISULFATE 75 MG/1
75 TABLET ORAL DAILY
Qty: 90 TABLET | Refills: 1 | Status: SHIPPED | OUTPATIENT
Start: 2023-08-17 | End: 2024-02-13

## 2023-08-17 NOTE — TELEPHONE ENCOUNTER
Patient is requesting medication refill.  Please approve or deny this request.    Rx requested:  Requested Prescriptions     Pending Prescriptions Disp Refills    clopidogrel (PLAVIX) 75 MG tablet 90 tablet 1     Sig: Take 1 tablet by mouth daily         Last Office Visit:   4/18/2023      Next Visit Date:  Future Appointments   Date Time Provider [address] [place] [address]   8/29/2023  2:00 PM Jagjit Hinkle MD [address] Neurology -

## 2023-08-29 ENCOUNTER — OFFICE VISIT (OUTPATIENT)
Dept: NEUROLOGY | Age: 78
End: 2023-08-29
Payer: MEDICARE

## 2023-08-29 ENCOUNTER — HOSPITAL ENCOUNTER (OUTPATIENT)
Dept: LAB | Age: 78
Discharge: HOME | End: 2023-08-29
Payer: MEDICARE

## 2023-08-29 VITALS
DIASTOLIC BLOOD PRESSURE: 70 MMHG | HEART RATE: 70 BPM | SYSTOLIC BLOOD PRESSURE: 132 MMHG | WEIGHT: 164.8 LBS | BODY MASS INDEX: 25.81 KG/M2

## 2023-08-29 DIAGNOSIS — T14.8XXA BRUISING: ICD-10-CM

## 2023-08-29 DIAGNOSIS — I63.9 ACUTE CVA (CEREBROVASCULAR ACCIDENT) (HCC): ICD-10-CM

## 2023-08-29 DIAGNOSIS — Z92.82 RECEIVED INTRAVENOUS TISSUE PLASMINOGEN ACTIVATOR (TPA) IN EMERGENCY DEPARTMENT: ICD-10-CM

## 2023-08-29 DIAGNOSIS — I63.9 ACUTE CVA (CEREBROVASCULAR ACCIDENT) (HCC): Primary | ICD-10-CM

## 2023-08-29 LAB
ALBUMIN SERPL-MCNC: 4.3 G/DL (ref 3.5–4.6)
ALP SERPL-CCNC: 84 U/L (ref 35–104)
ALT SERPL-CCNC: 17 U/L (ref 0–41)
AST SERPL-CCNC: 30 U/L (ref 0–40)
BILIRUB DIRECT SERPL-MCNC: <0.2 MG/DL (ref 0–0.4)
BILIRUB INDIRECT SERPL-MCNC: NORMAL MG/DL (ref 0–0.6)
BILIRUB SERPL-MCNC: 0.7 MG/DL (ref 0.2–0.7)
PROT SERPL-MCNC: 6.5 G/DL (ref 6.3–8)

## 2023-08-29 PROCEDURE — G8417 CALC BMI ABV UP PARAM F/U: HCPCS | Performed by: PSYCHIATRY & NEUROLOGY

## 2023-08-29 PROCEDURE — 3017F COLORECTAL CA SCREEN DOC REV: CPT | Performed by: PSYCHIATRY & NEUROLOGY

## 2023-08-29 PROCEDURE — 99214 OFFICE O/P EST MOD 30 MIN: CPT | Performed by: PSYCHIATRY & NEUROLOGY

## 2023-08-29 PROCEDURE — 3074F SYST BP LT 130 MM HG: CPT | Performed by: PSYCHIATRY & NEUROLOGY

## 2023-08-29 PROCEDURE — 36415 COLL VENOUS BLD VENIPUNCTURE: CPT

## 2023-08-29 PROCEDURE — G8427 DOCREV CUR MEDS BY ELIG CLIN: HCPCS | Performed by: PSYCHIATRY & NEUROLOGY

## 2023-08-29 PROCEDURE — 1036F TOBACCO NON-USER: CPT | Performed by: PSYCHIATRY & NEUROLOGY

## 2023-08-29 PROCEDURE — 80076 HEPATIC FUNCTION PANEL: CPT

## 2023-08-29 PROCEDURE — 86147 CARDIOLIPIN ANTIBODY EA IG: CPT

## 2023-08-29 PROCEDURE — 1123F ACP DISCUSS/DSCN MKR DOCD: CPT | Performed by: PSYCHIATRY & NEUROLOGY

## 2023-08-29 PROCEDURE — 3078F DIAST BP <80 MM HG: CPT | Performed by: PSYCHIATRY & NEUROLOGY

## 2023-08-29 ASSESSMENT — ENCOUNTER SYMPTOMS
VOMITING: 0
TROUBLE SWALLOWING: 0
CHOKING: 0
COLOR CHANGE: 0
PHOTOPHOBIA: 0
NAUSEA: 0
SHORTNESS OF BREATH: 0
BACK PAIN: 0

## 2023-08-29 NOTE — PROGRESS NOTES
Subjective:      Patient ID: Karyna Cano is a 78 y.o. male who presents today for:  Chief Complaint   Patient presents with    Follow-up     Pt states that he is well no concerns. HPI 78 right-handed gentleman now with a known history of stroke. Patient was seen here in April. Patient was treated with TNK with not seen him was having considerable bruising on dual antiplatelet therapy. Prior to this. Added Plavix to his aspirin. Patient has considerable bruising going on. When I had seen him we had just discontinued the medication we had further obtain coagulation studies and his APTT appeared to be somewhat elevated. When last seen we had discontinued his aspirin and continue on Plavix. He still has some minor bruising but otherwise doing well    Past Medical History:   Diagnosis Date    Hyperlipidemia      Past Surgical History:   Procedure Laterality Date    CARDIAC SURGERY       Social History     Socioeconomic History    Marital status:      Spouse name: Not on file    Number of children: Not on file    Years of education: Not on file    Highest education level: Not on file   Occupational History    Not on file   Tobacco Use    Smoking status: Never     Passive exposure: Never    Smokeless tobacco: Never   Vaping Use    Vaping Use: Never used   Substance and Sexual Activity    Alcohol use: Never    Drug use: Never    Sexual activity: Yes     Partners: Female   Other Topics Concern    Not on file   Social History Narrative    Not on file     Social Determinants of Health     Financial Resource Strain: Not on file   Food Insecurity: Not on file   Transportation Needs: Not on file   Physical Activity: Not on file   Stress: Not on file   Social Connections: Not on file   Intimate Partner Violence: Not on file   Housing Stability: Not on file     No family history on file.   No Known Allergies    Current Outpatient Medications   Medication Sig Dispense Refill    clopidogrel (PLAVIX) 75 MG

## 2023-09-01 LAB
CARDIOLIPIN IGA SER IA-ACNC: <10 APL
CARDIOLIPIN IGG SER IA-ACNC: <10 GPL
CARDIOLIPIN IGM SER IA-ACNC: 13 MPL

## 2023-12-27 PROBLEM — I25.10 ATHEROSCLEROSIS OF NATIVE CORONARY ARTERY WITHOUT ANGINA PECTORIS: Status: ACTIVE | Noted: 2023-12-27

## 2023-12-27 PROBLEM — E78.5 HLD (HYPERLIPIDEMIA): Status: ACTIVE | Noted: 2023-12-27

## 2023-12-27 PROBLEM — R06.02 SOB (SHORTNESS OF BREATH): Status: ACTIVE | Noted: 2023-12-27

## 2023-12-27 RX ORDER — SIMVASTATIN 80 MG/1
1 TABLET, FILM COATED ORAL DAILY
COMMUNITY

## 2023-12-27 RX ORDER — MULTIVIT-MIN/IRON/FOLIC ACID/K 18-600-40
CAPSULE ORAL
COMMUNITY

## 2023-12-27 RX ORDER — PETROLATUM,WHITE/LANOLIN
1 OINTMENT (GRAM) TOPICAL DAILY
COMMUNITY

## 2023-12-27 RX ORDER — CHOLECALCIFEROL (VITAMIN D3) 25 MCG
1 TABLET ORAL DAILY
COMMUNITY

## 2024-02-16 RX ORDER — CLOPIDOGREL BISULFATE 75 MG/1
75 TABLET ORAL DAILY
Qty: 90 TABLET | Refills: 1 | Status: SHIPPED | OUTPATIENT
Start: 2024-02-16 | End: 2024-08-14

## 2024-02-16 NOTE — TELEPHONE ENCOUNTER
Patient is requesting medication refill. Please approve or deny this request.    Rx requested:  Requested Prescriptions     Pending Prescriptions Disp Refills    clopidogrel (PLAVIX) 75 MG tablet 90 tablet 1     Sig: Take 1 tablet by mouth daily         Last Office Visit:   8/29/2023      Next Visit Date:  Future Appointments   Date Time Provider Department Center   6/20/2024  4:00 PM Jagjit Stearns MD [facility] -

## 2024-06-20 ENCOUNTER — OFFICE VISIT (OUTPATIENT)
Dept: NEUROLOGY | Age: 79
End: 2024-06-20
Payer: MEDICARE

## 2024-06-20 VITALS
BODY MASS INDEX: 24.9 KG/M2 | WEIGHT: 159 LBS | SYSTOLIC BLOOD PRESSURE: 130 MMHG | DIASTOLIC BLOOD PRESSURE: 80 MMHG | HEART RATE: 89 BPM

## 2024-06-20 DIAGNOSIS — Z92.82 RECEIVED INTRAVENOUS TISSUE PLASMINOGEN ACTIVATOR (TPA) IN EMERGENCY DEPARTMENT: ICD-10-CM

## 2024-06-20 DIAGNOSIS — I63.9 ACUTE CVA (CEREBROVASCULAR ACCIDENT) (HCC): Primary | ICD-10-CM

## 2024-06-20 PROCEDURE — 99213 OFFICE O/P EST LOW 20 MIN: CPT | Performed by: PSYCHIATRY & NEUROLOGY

## 2024-06-20 PROCEDURE — 1123F ACP DISCUSS/DSCN MKR DOCD: CPT | Performed by: PSYCHIATRY & NEUROLOGY

## 2024-06-20 PROCEDURE — G8427 DOCREV CUR MEDS BY ELIG CLIN: HCPCS | Performed by: PSYCHIATRY & NEUROLOGY

## 2024-06-20 PROCEDURE — 3079F DIAST BP 80-89 MM HG: CPT | Performed by: PSYCHIATRY & NEUROLOGY

## 2024-06-20 PROCEDURE — 3075F SYST BP GE 130 - 139MM HG: CPT | Performed by: PSYCHIATRY & NEUROLOGY

## 2024-06-20 PROCEDURE — G8420 CALC BMI NORM PARAMETERS: HCPCS | Performed by: PSYCHIATRY & NEUROLOGY

## 2024-06-20 PROCEDURE — 1036F TOBACCO NON-USER: CPT | Performed by: PSYCHIATRY & NEUROLOGY

## 2024-06-20 NOTE — PROGRESS NOTES
\"CANNAB\", \"LABMETH\", \"PPXUR\", \"ETOH\"  No results found for: \"LITHIUM\", \"DILFRTOT\", \"VALPROATE\"    Assessment:       Diagnosis Orders   1. Acute CVA (cerebrovascular accident) (HCC)        2. Received intravenous tissue plasminogen activator (tPA) in emergency department        Left cerebral stroke with right-sided symptoms which cleared up with TNK.  Patient she did very well.  We had to make a follow-up appointment his cardiolipin antibodies were elevated initially.  We repeated this and these are normal.  Patient actually doing well otherwise in general health as well.  He does not require anticoagulation.  I recommend that he continue on Plavix as he had a stroke while he was on aspirin for a while.  He is on dual antiplatelet therapy.  Patient will follow-up with his primary care now.    Jagjit Stearns MD, CAM  Diplomate, American Board of Psychiatry & Neurology  Board Certified in Vascular Neurology  Board Certified in Neuromuscular Medicine  Certified in Neurorehabilitation         Plan:      No orders of the defined types were placed in this encounter.    No orders of the defined types were placed in this encounter.      No follow-ups on file.      Jagjit Stearns MD

## 2024-08-14 RX ORDER — CLOPIDOGREL BISULFATE 75 MG/1
75 TABLET ORAL DAILY
Qty: 30 TABLET | Refills: 0 | Status: SHIPPED | OUTPATIENT
Start: 2024-08-14 | End: 2024-09-13

## 2024-08-14 NOTE — TELEPHONE ENCOUNTER
PATIENT IS PRN BUT NEEDED ANOTHER MONTH OF MEDICATION ( PLAVIX) TO GET THROUGH UNTIL HE SEES CARDIOLOGIST ON 9/13/2024

## 2024-09-09 RX ORDER — CLOPIDOGREL BISULFATE 75 MG/1
75 TABLET ORAL DAILY
Qty: 90 TABLET | Refills: 1 | Status: SHIPPED | OUTPATIENT
Start: 2024-09-09

## 2024-09-13 ENCOUNTER — APPOINTMENT (OUTPATIENT)
Dept: CARDIOLOGY | Facility: CLINIC | Age: 79
End: 2024-09-13
Payer: MEDICARE

## 2024-09-13 VITALS
DIASTOLIC BLOOD PRESSURE: 66 MMHG | SYSTOLIC BLOOD PRESSURE: 124 MMHG | HEART RATE: 67 BPM | BODY MASS INDEX: 24.22 KG/M2 | WEIGHT: 159.8 LBS | HEIGHT: 68 IN

## 2024-09-13 DIAGNOSIS — E78.49 OTHER HYPERLIPIDEMIA: ICD-10-CM

## 2024-09-13 DIAGNOSIS — I25.10 ATHEROSCLEROSIS OF NATIVE CORONARY ARTERY OF NATIVE HEART WITHOUT ANGINA PECTORIS: Primary | ICD-10-CM

## 2024-09-13 DIAGNOSIS — Z98.61 HX OF PERCUTANEOUS TRANSLUMINAL CORONARY ANGIOPLASTY: ICD-10-CM

## 2024-09-13 DIAGNOSIS — I63.9 STROKE ABORTED BY ADMINISTRATION OF THROMBOLYTIC AGENT (MULTI): ICD-10-CM

## 2024-09-13 PROCEDURE — 1036F TOBACCO NON-USER: CPT | Performed by: INTERNAL MEDICINE

## 2024-09-13 PROCEDURE — 99214 OFFICE O/P EST MOD 30 MIN: CPT | Performed by: INTERNAL MEDICINE

## 2024-09-13 PROCEDURE — 1159F MED LIST DOCD IN RCRD: CPT | Performed by: INTERNAL MEDICINE

## 2024-09-13 RX ORDER — CLOPIDOGREL BISULFATE 75 MG/1
75 TABLET ORAL DAILY
Qty: 90 TABLET | Refills: 3 | Status: SHIPPED | OUTPATIENT
Start: 2024-09-13 | End: 2025-09-13

## 2024-09-13 RX ORDER — BISMUTH SUBSALICYLATE 262 MG
1 TABLET,CHEWABLE ORAL DAILY
COMMUNITY

## 2024-09-13 RX ORDER — CLOPIDOGREL BISULFATE 75 MG/1
1 TABLET ORAL DAILY
COMMUNITY
Start: 2023-03-14 | End: 2024-09-13 | Stop reason: SDUPTHER

## 2024-09-13 RX ORDER — SIMVASTATIN 80 MG/1
80 TABLET, FILM COATED ORAL DAILY
Qty: 90 TABLET | Refills: 3 | Status: SHIPPED | OUTPATIENT
Start: 2024-09-13 | End: 2025-09-13

## 2024-09-13 RX ORDER — ZINC GLUCONATE 100 MG
100 TABLET ORAL DAILY
COMMUNITY

## 2024-09-13 NOTE — PROGRESS NOTES
Patient:  Mj Ames  YOB: 1945  MRN: [id number]       HPI:       Mj Ames is a 78 y.o. male who returns today for cardiac follow-up.  He has atherosclerotic heart disease dating back to 2001 when he presented with progressive angina pectoris. He was residing in [place] at the time. He underwent angioplasty and stenting of the right coronary artery. He developed recurrent anginal symptoms in October 2011 and he underwent coronary artery bypass grafting surgery x 5 at [facility] in [place], [place]. He was noted to have normal LV systolic function. A myocardial perfusion study in August 2017 was negative for ischemia or infarction. LV ejection fraction was 60%. He was noted to have excellent functional aerobic capacity. He does not have any history of hypertension or diabetes mellitus and has never smoked. 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      He previously noted some increased exertional shortness of breath. He underwent a stress echocardiogram on October 6, 2022. He achieved 98% of maximally predicted heart rate at the 10.9 METS level. His blood pressure response was normal. No exercise-induced chest discomfort or ST changes. Estimated LV ejection fraction was 55%. Normal valvular structure and function. No exercise-induced wall motion abnormalities.     He was hospitalized on March 11, 2023 with complaints of numbness, weakness, and mild speech abnormality. He was diagnosed with a left cerebral stroke and was treated with tPA. He was hospitalized at [facility]. CTA of the head and neck was negative for any large vessel occlusion. There was left posterior cerebral artery severe stenosis. 40% stenosis of the left internal carotid artery. No acute intracranial hemorrhage. There was some chronic microvascular ischemia  versus stroke involving the left frontal white matter. MRI of the brain was negative. He was placed on clopidogrel. He was having considerable bruising on DAPT and aspirin was discontinued. His EKG showed normal sinus rhythm and sinus arrhythmia. Repeat echocardiogram March 13, 2023 showed estimated LV ejection fraction 60% with normal valvular structure and function. Saline contrast bubble study was negative for intra-atrial shunting.     He has been doing well since his last visit.  He remains very active though states he has been walking less than usual.  He has been playing softball regularly and over 60-lead.  He does some regular weight training as well. He denies any chest pain or shortness of breath at rest. He denies any orthopnea, PND, or increasing peripheral edema. He denies any palpitations, lightheadedness, near-syncope, or syncope. He denies any fever, chills, or cough. He denies any nausea, vomiting, or diaphoresis. He denies any hemoptysis, hematemesis, melena, or hematochezia. His blood pressures are well controlled. Lab studies March 3, 2024 at  showed a normal CBC and CMP with exception of AST 60.  Cholesterol 114 with triglycerides 79, HDL 40, and LDL 58.  He is currently free of any anginal or CHF symptoms.  His blood pressures are well-controlled.  He will continue on his same medications.  Other details as noted below.    The above portion of this note was dictated by me using voice recognition software. I personally performed the services described in the documentation. The scribe entering the documentation below was in my presence. I affirm that the information is both accurate and complete.       Objective:     Vitals:    09/13/24 1233   BP: 124/66   Pulse: 67       Wt Readings from Last 4 Encounters:   09/14/23 74.8 kg (165 lb)   09/08/22 74.4 kg (164 lb)       Allergies:     Not on File       Medications:     Current Outpatient Medications   Medication Instructions     ascorbic acid, vitamin C, 500 mg capsule oral    cholecalciferol (Vitamin D-3) 25 MCG (1000 UT) tablet 1 tablet, oral, Daily    glucosamine sulfate 500 mg capsule 1 capsule, oral, Daily    simvastatin (Zocor) 80 mg tablet 1 tablet, oral, Daily       Physical Examination:   GENERAL:  Well developed, well nourished, in no acute distress.  CHEST:  Symmetric and nontender.  NEURO/PSYCH:  Alert and oriented times three with approppriate behavior and responses.  NECK:  Supple, no JVD, no bruit.  LUNGS:  Clear to auscultation bilaterally, normal respiratory effort.  HEART:  Rate and rhythm regular with no evident murmur, no gallop appreciated.        There are no rubs, clicks or heaves.  EXTREMITIES:  Warm with good color, no clubbing or cyanosis.  There is no edema noted.  PERIPHERAL VASCULAR:  Pulses present and equally palpable; 2+ throughout.      Problem List:     Patient Active Problem List   Diagnosis    Atherosclerosis of native coronary artery without angina pectoris    HLD (hyperlipidemia)    SOB (shortness of breath)       Asessment:     Problem List Items Addressed This Visit             ICD-10-CM    Atherosclerosis of native coronary artery without angina pectoris - Primary I25.10    Relevant Medications    clopidogrel (Plavix) 75 mg tablet    Other Relevant Orders    Follow Up In Cardiology    HLD (hyperlipidemia) E78.5    Relevant Medications    simvastatin (Zocor) 80 mg tablet    Other Relevant Orders    Follow Up In Cardiology    Hx of percutaneous transluminal coronary angioplasty Z98.61    Relevant Orders    Follow Up In Cardiology    Stroke aborted by administration of thrombolytic agent (Multi) I63.9    Relevant Medications    clopidogrel (Plavix) 75 mg tablet    Other Relevant Orders    Follow Up In Cardiology

## 2025-02-13 ENCOUNTER — OFFICE VISIT (OUTPATIENT)
Dept: FAMILY MEDICINE CLINIC | Age: 80
End: 2025-02-13
Payer: MEDICARE

## 2025-02-13 VITALS
OXYGEN SATURATION: 99 % | SYSTOLIC BLOOD PRESSURE: 130 MMHG | HEART RATE: 65 BPM | BODY MASS INDEX: 25.4 KG/M2 | HEIGHT: 68 IN | WEIGHT: 167.6 LBS | TEMPERATURE: 97.4 F | DIASTOLIC BLOOD PRESSURE: 80 MMHG

## 2025-02-13 DIAGNOSIS — R05.1 ACUTE COUGH: ICD-10-CM

## 2025-02-13 DIAGNOSIS — J02.9 SORE THROAT: Primary | ICD-10-CM

## 2025-02-13 PROCEDURE — G8419 CALC BMI OUT NRM PARAM NOF/U: HCPCS | Performed by: NURSE PRACTITIONER

## 2025-02-13 PROCEDURE — 3079F DIAST BP 80-89 MM HG: CPT | Performed by: NURSE PRACTITIONER

## 2025-02-13 PROCEDURE — 1036F TOBACCO NON-USER: CPT | Performed by: NURSE PRACTITIONER

## 2025-02-13 PROCEDURE — G8427 DOCREV CUR MEDS BY ELIG CLIN: HCPCS | Performed by: NURSE PRACTITIONER

## 2025-02-13 PROCEDURE — 3075F SYST BP GE 130 - 139MM HG: CPT | Performed by: NURSE PRACTITIONER

## 2025-02-13 PROCEDURE — 99203 OFFICE O/P NEW LOW 30 MIN: CPT | Performed by: NURSE PRACTITIONER

## 2025-02-13 PROCEDURE — 1123F ACP DISCUSS/DSCN MKR DOCD: CPT | Performed by: NURSE PRACTITIONER

## 2025-02-13 PROCEDURE — 1159F MED LIST DOCD IN RCRD: CPT | Performed by: NURSE PRACTITIONER

## 2025-02-13 RX ORDER — FEXOFENADINE HCL 180 MG/1
180 TABLET ORAL DAILY
Qty: 30 TABLET | Refills: 0 | Status: SHIPPED | OUTPATIENT
Start: 2025-02-13 | End: 2025-03-15

## 2025-02-13 RX ORDER — AZITHROMYCIN 250 MG/1
TABLET, FILM COATED ORAL
Qty: 6 TABLET | Refills: 0 | Status: SHIPPED | OUTPATIENT
Start: 2025-02-13 | End: 2025-02-18

## 2025-02-13 SDOH — ECONOMIC STABILITY: FOOD INSECURITY: WITHIN THE PAST 12 MONTHS, YOU WORRIED THAT YOUR FOOD WOULD RUN OUT BEFORE YOU GOT MONEY TO BUY MORE.: NEVER TRUE

## 2025-02-13 SDOH — ECONOMIC STABILITY: FOOD INSECURITY: WITHIN THE PAST 12 MONTHS, THE FOOD YOU BOUGHT JUST DIDN'T LAST AND YOU DIDN'T HAVE MONEY TO GET MORE.: NEVER TRUE

## 2025-02-13 ASSESSMENT — ENCOUNTER SYMPTOMS
RHINORRHEA: 0
TROUBLE SWALLOWING: 0
VOMITING: 0
WHEEZING: 0
SHORTNESS OF BREATH: 0
SORE THROAT: 1
DIARRHEA: 0
NAUSEA: 0
COUGH: 1

## 2025-02-13 ASSESSMENT — PATIENT HEALTH QUESTIONNAIRE - PHQ9: DEPRESSION UNABLE TO ASSESS: URGENT/EMERGENT SITUATION

## 2025-02-13 NOTE — PROGRESS NOTES
Jarrett Brownlee (:  1945) is a 79 y.o. male, New patient, here for evaluation of the following chief complaint(s):  Other (Cough, chest congestion, sore throat [id number] )      Vitals:    25 1322   BP: 130/80   Pulse: 65   Temp: 97.4 °F (36.3 °C)   SpO2: 99%       ASSESSMENT/PLAN:  1. Sore throat  -     fexofenadine (ALLEGRA) 180 MG tablet; Take 1 tablet by mouth daily, Disp-30 tablet, R-0Normal  2. Acute cough  -     azithromycin (ZITHROMAX) 250 MG tablet; 500 mg on day 1, then 250 mg days 2-5., Disp-6 tablet, R-0Normal        Return in about 1 month (around 3/13/2025) for follow up with PCP as scheduled.      SUBJECTIVE/OBJECTIVE:    Pharyngitis  This is a new problem. Episode onset: x2 months cough, sore throat, chest congestion. The problem occurs constantly. The problem has been gradually worsening. Associated symptoms include coughing and a sore throat. Pertinent negatives include no chest pain, chills, congestion, fatigue, fever, headaches, nausea or vomiting. The symptoms are aggravated by coughing and swallowing. Treatments tried: mucinex DM, patricia seltzer chews. The treatment provided moderate relief.       Review of Systems   Constitutional:  Negative for chills, fatigue and fever.   HENT:  Positive for sore throat. Negative for congestion, postnasal drip, rhinorrhea and trouble swallowing.    Respiratory:  Positive for cough. Negative for shortness of breath and wheezing.    Cardiovascular:  Negative for chest pain and palpitations.   Gastrointestinal:  Negative for diarrhea, nausea and vomiting.   Neurological:  Negative for dizziness, light-headedness and headaches.       Physical Exam  Vitals reviewed.   Constitutional:       General: He is not in acute distress.     Appearance: Normal appearance.   HENT:      Right Ear: No middle ear effusion. Tympanic membrane is not erythematous.      Left Ear:  No middle ear effusion. Tympanic membrane is not erythematous.      Nose: Nose normal.

## 2025-02-24 ENCOUNTER — OFFICE VISIT (OUTPATIENT)
Dept: FAMILY MEDICINE CLINIC | Age: 80
End: 2025-02-24
Payer: MEDICARE

## 2025-02-24 VITALS
TEMPERATURE: 98.3 F | HEIGHT: 68 IN | WEIGHT: 167.4 LBS | HEART RATE: 65 BPM | RESPIRATION RATE: 14 BRPM | DIASTOLIC BLOOD PRESSURE: 58 MMHG | OXYGEN SATURATION: 94 % | BODY MASS INDEX: 25.37 KG/M2 | SYSTOLIC BLOOD PRESSURE: 106 MMHG

## 2025-02-24 DIAGNOSIS — R05.1 ACUTE COUGH: Primary | ICD-10-CM

## 2025-02-24 DIAGNOSIS — J34.89 SINUS DRAINAGE: ICD-10-CM

## 2025-02-24 DIAGNOSIS — B34.9 VIRAL ILLNESS: ICD-10-CM

## 2025-02-24 LAB
INFLUENZA A ANTIBODY: NEGATIVE
INFLUENZA B ANTIBODY: NEGATIVE
Lab: NORMAL
PERFORMING INSTRUMENT: NORMAL
QC PASS/FAIL: NORMAL
SARS-COV-2, POC: NORMAL

## 2025-02-24 PROCEDURE — 99213 OFFICE O/P EST LOW 20 MIN: CPT | Performed by: NURSE PRACTITIONER

## 2025-02-24 PROCEDURE — 1159F MED LIST DOCD IN RCRD: CPT | Performed by: NURSE PRACTITIONER

## 2025-02-24 PROCEDURE — G8419 CALC BMI OUT NRM PARAM NOF/U: HCPCS | Performed by: NURSE PRACTITIONER

## 2025-02-24 PROCEDURE — 3074F SYST BP LT 130 MM HG: CPT | Performed by: NURSE PRACTITIONER

## 2025-02-24 PROCEDURE — 1123F ACP DISCUSS/DSCN MKR DOCD: CPT | Performed by: NURSE PRACTITIONER

## 2025-02-24 PROCEDURE — 1036F TOBACCO NON-USER: CPT | Performed by: NURSE PRACTITIONER

## 2025-02-24 PROCEDURE — 3078F DIAST BP <80 MM HG: CPT | Performed by: NURSE PRACTITIONER

## 2025-02-24 PROCEDURE — 87426 SARSCOV CORONAVIRUS AG IA: CPT | Performed by: NURSE PRACTITIONER

## 2025-02-24 PROCEDURE — G8427 DOCREV CUR MEDS BY ELIG CLIN: HCPCS | Performed by: NURSE PRACTITIONER

## 2025-02-24 PROCEDURE — 87804 INFLUENZA ASSAY W/OPTIC: CPT | Performed by: NURSE PRACTITIONER

## 2025-02-24 RX ORDER — METHYLPREDNISOLONE 4 MG/1
TABLET ORAL
Qty: 1 KIT | Refills: 0 | Status: SHIPPED | OUTPATIENT
Start: 2025-02-24

## 2025-02-24 RX ORDER — FLUTICASONE PROPIONATE 50 MCG
SPRAY, SUSPENSION (ML) NASAL
Qty: 1 EACH | Refills: 1 | Status: SHIPPED | OUTPATIENT
Start: 2025-02-24

## 2025-02-24 RX ORDER — AZELASTINE 1 MG/ML
1 SPRAY, METERED NASAL 2 TIMES DAILY
Qty: 30 ML | Refills: 5 | Status: SHIPPED | OUTPATIENT
Start: 2025-02-24

## 2025-02-24 ASSESSMENT — ENCOUNTER SYMPTOMS
RHINORRHEA: 1
COUGH: 1
EYE REDNESS: 0
WHEEZING: 0
EYE DISCHARGE: 0
EYE ITCHING: 0
SHORTNESS OF BREATH: 0
VOICE CHANGE: 1
SORE THROAT: 0
SINUS PRESSURE: 1

## 2025-02-24 ASSESSMENT — PATIENT HEALTH QUESTIONNAIRE - PHQ9
SUM OF ALL RESPONSES TO PHQ9 QUESTIONS 1 & 2: 0
SUM OF ALL RESPONSES TO PHQ QUESTIONS 1-9: 0
SUM OF ALL RESPONSES TO PHQ QUESTIONS 1-9: 0
2. FEELING DOWN, DEPRESSED OR HOPELESS: NOT AT ALL
SUM OF ALL RESPONSES TO PHQ QUESTIONS 1-9: 0
SUM OF ALL RESPONSES TO PHQ QUESTIONS 1-9: 0
1. LITTLE INTEREST OR PLEASURE IN DOING THINGS: NOT AT ALL

## 2025-02-24 NOTE — PROGRESS NOTES
Jarrett Brownlee (:  1945) is a 79 y.o. male, Established patient, here for evaluation of the following chief complaint(s):  Other (Came in 11 days ago, got meds, not getting any better./Really bad cold, congestion has moved into chest. /Coughing, sinus congestion, runny nose, no fever,  no chills, or body aches.   )      Vitals:    25 1459   BP: (!) 106/58   Pulse: 65   Resp: 14   Temp: 98.3 °F (36.8 °C)   SpO2: 94%       ASSESSMENT/PLAN:  1. Acute cough  -     methylPREDNISolone (MEDROL, SANTANA,) 4 MG tablet; Take by mouth., Disp-1 kit, R-0Normal  2. Sinus drainage  -     azelastine (ASTELIN) 0.1 % nasal spray; 1 spray by Nasal route 2 times daily Use in each nostril as directed, Disp-30 mL, R-5Normal  -     fluticasone (FLONASE) 50 MCG/ACT nasal spray; Take 1 spray each nostril at night, Disp-1 each, R-1Normal  3. Viral illness  -     POCT Influenza A/B  -     POCT COVID-19, Antigen                      NEG for both        -     suggested otc Sambucol      Return if symptoms worsen or fail to improve.      SUBJECTIVE/OBJECTIVE:    Other  This is a recurrent problem. Episode onset: was seen x11 days ago for cough which started a couple months ago.  Given antibiotic and allegra and is  still having. The problem occurs constantly. The problem has been gradually worsening. Associated symptoms include congestion and coughing. Pertinent negatives include no arthralgias, chest pain, chills, fatigue, fever, myalgias, neck pain or sore throat. Associated symptoms comments: Runny nose. The symptoms are aggravated by coughing. Treatments tried: antibiotic, allegra. The treatment provided no relief.         Review of Systems   Constitutional:  Negative for chills, fatigue and fever.   HENT:  Positive for congestion, ear pain, postnasal drip, rhinorrhea, sinus pressure and voice change (hoarse). Negative for sore throat.    Eyes:  Negative for discharge, redness and itching.   Respiratory:  Positive for cough.

## 2025-09-03 DIAGNOSIS — E78.49 OTHER HYPERLIPIDEMIA: ICD-10-CM

## 2025-09-03 DIAGNOSIS — I63.9 STROKE ABORTED BY ADMINISTRATION OF THROMBOLYTIC AGENT (MULTI): ICD-10-CM

## 2025-09-03 RX ORDER — CLOPIDOGREL BISULFATE 75 MG/1
75 TABLET ORAL DAILY
Qty: 1 TABLET | Refills: 0 | OUTPATIENT
Start: 2025-09-03

## 2025-09-03 RX ORDER — SIMVASTATIN 80 MG/1
80 TABLET, FILM COATED ORAL DAILY
Qty: 1 TABLET | Refills: 0 | OUTPATIENT
Start: 2025-09-03

## 2025-09-12 ENCOUNTER — APPOINTMENT (OUTPATIENT)
Dept: CARDIOLOGY | Facility: CLINIC | Age: 80
End: 2025-09-12
Payer: MEDICARE